# Patient Record
Sex: FEMALE | Race: WHITE | Employment: FULL TIME | URBAN - METROPOLITAN AREA
[De-identification: names, ages, dates, MRNs, and addresses within clinical notes are randomized per-mention and may not be internally consistent; named-entity substitution may affect disease eponyms.]

---

## 2017-02-15 ENCOUNTER — OFFICE VISIT (OUTPATIENT)
Dept: INTERNAL MEDICINE CLINIC | Age: 47
End: 2017-02-15

## 2017-02-15 VITALS
OXYGEN SATURATION: 98 % | DIASTOLIC BLOOD PRESSURE: 65 MMHG | TEMPERATURE: 98.5 F | SYSTOLIC BLOOD PRESSURE: 99 MMHG | HEART RATE: 83 BPM | BODY MASS INDEX: 23.22 KG/M2 | RESPIRATION RATE: 16 BRPM | HEIGHT: 64 IN | WEIGHT: 136 LBS

## 2017-02-15 DIAGNOSIS — Z00.00 ROUTINE GENERAL MEDICAL EXAMINATION AT A HEALTH CARE FACILITY: ICD-10-CM

## 2017-02-15 DIAGNOSIS — F43.9 SITUATIONAL STRESS: ICD-10-CM

## 2017-02-15 DIAGNOSIS — G43.119 INTRACTABLE MIGRAINE WITH AURA WITHOUT STATUS MIGRAINOSUS: Primary | ICD-10-CM

## 2017-02-15 RX ORDER — OMEPRAZOLE 40 MG/1
40 CAPSULE, DELAYED RELEASE ORAL DAILY
Qty: 90 CAP | Refills: 2 | Status: SHIPPED | OUTPATIENT
Start: 2017-02-15 | End: 2018-05-17 | Stop reason: SDUPTHER

## 2017-02-15 RX ORDER — PROMETHAZINE HYDROCHLORIDE 12.5 MG/1
12.5 TABLET ORAL
Qty: 20 TAB | Refills: 1 | Status: SHIPPED | OUTPATIENT
Start: 2017-02-15 | End: 2018-02-07 | Stop reason: SDUPTHER

## 2017-02-15 RX ORDER — CLONAZEPAM 0.5 MG/1
0.25 TABLET ORAL
Qty: 10 TAB | Refills: 0 | Status: SHIPPED | OUTPATIENT
Start: 2017-02-15 | End: 2017-03-30 | Stop reason: SDUPTHER

## 2017-02-15 RX ORDER — BUTALBITAL, ACETAMINOPHEN AND CAFFEINE 50; 325; 40 MG/1; MG/1; MG/1
1 TABLET ORAL
Qty: 25 TAB | Refills: 1 | Status: SHIPPED | OUTPATIENT
Start: 2017-02-15 | End: 2018-02-07 | Stop reason: SDUPTHER

## 2017-02-15 NOTE — PROGRESS NOTES
Jessica Grimm is a 42 y.o. female and presents with Well Woman (fasting       no pap )  . Subjective:  Pt present for annual. She is tearful during history taking. Pt reports broke up with her 10 year relationship and now her ex boyfriend is dating her best friend. She does not want to see a counselor right now. She is taking 1/2 tab clonazepam 0.5 mg for the past 2 weeks. Clonazepam helps her mind stop racing. She did not like being on zoloft because it caused her dizziness when starting but when on and skipped a tab did have more dizziness and headache. She has never tried any other medications. She reports found out 3 weeks ago and had 2 migraines. She feels she gets good sleep on the clonazepam, 7 hours, and feels rested. She stopped her zoloft in the summer. She never required clonazepam while on zoloft or even being off zoloft. amitriptline she gained 30 pounds in 30 days. She bought melatonin to try.    mental health 6-7 /10   One twinSon has addiction issues  The other son  in July  Daughter obese    JERRY  Can not take topamax    Trap muscle spasm  Still there with stress    Cigarette  Restarted   Smokes because stressed  10 cigs/day  Drinks etoh once a month- does not drink etoh with migraine hx      Review of Systems  Constitutional: negative for fevers, chills, anorexia and weight loss  Eyes:   negative for visual disturbance and irritation  ENT:   negative for tinnitus,sore throat,nasal congestion,ear pains. hoarseness  Respiratory:  negative for cough, hemoptysis, dyspnea,wheezing  CV:   negative for chest pain, palpitations, lower extremity edema  GI:   negative for nausea, vomiting, diarrhea, abdominal pain,melena  Endo:               negative for polyuria,polydipsia,polyphagia,heat intolerance  Genitourinary: negative for frequency, dysuria and hematuria  Integument:  negative for rash and pruritus  Hematologic:  negative for easy bruising and gum/nose bleeding  Musculoskel: negative for myalgias, arthralgias, back pain, muscle weakness, joint pain  Neurological:  negative for headaches, dizziness, vertigo, memory problems and gait   Behavl/Psych: negative for feelings of anxiety, depression, mood changes    Past Medical History   Diagnosis Date    Anxiety     GERD (gastroesophageal reflux disease)     Headache      Past Surgical History   Procedure Laterality Date    Pr breast surgery procedure unlisted      Hx total abdominal hysterectomy  2007     fibroids and cysts     Social History     Social History    Marital status:      Spouse name: N/A    Number of children: N/A    Years of education: N/A     Social History Main Topics    Smoking status: Current Some Day Smoker     Last attempt to quit: 7/1/2013    Smokeless tobacco: Never Used      Comment: 20 cigs/day about 15 years off and on    Alcohol use 0.0 oz/week     0 Standard drinks or equivalent per week      Comment: occasional    Drug use: No    Sexual activity: Yes     Partners: Male     Other Topics Concern    None     Social History Narrative    Full time: sedentary     Manageable stress        3 kids raised by herself    20 yo daughter     25 yo twin men        2 children live at home and one of the twins got      Family History   Problem Relation Age of Onset    Thyroid Disease Mother     Cancer Father      lymphoma    Diabetes Father     Heart Disease Father     Hypertension Father     Diabetes Maternal Grandmother     Thyroid Disease Maternal Grandmother     Cancer Maternal Grandfather     Thyroid Disease Paternal Grandmother      Current Outpatient Prescriptions   Medication Sig Dispense Refill    clonazePAM (KLONOPIN) 0.5 mg tablet Take 0.5 Tabs by mouth nightly as needed. Max Daily Amount: 0.25 mg. 7 Tab 0    omeprazole (PRILOSEC) 40 mg capsule Take 1 Cap by mouth daily.  90 Cap 1    methocarbamol (ROBAXIN) 500 mg tablet Take 1-2 tabs po at night only as needed for trapezius pain 30 Tab 0    butalbital-acetaminophen-caffeine (FIORICET, ESGIC) -40 mg per tablet Take 1 Tab by mouth every six (6) hours as needed for Pain. Max Daily Amount: 4 Tabs. 6 Tab 0    fluticasone (FLONASE) 50 mcg/actuation nasal spray 2 sprays by Both Nostrils route daily. 1 Bottle 0    sertraline (ZOLOFT) 50 mg tablet Take 1 Tab by mouth daily. 90 Tab 1    montelukast (SINGULAIR) 10 mg tablet Take 1 Tab by mouth daily. 30 Tab 4    VIVELLE-DOT 0.1 mg/24 hr        Allergies   Allergen Reactions    Amitriptyline Other (comments)     Significant weight gain       Objective:  Visit Vitals    BP 99/65 (BP 1 Location: Left arm, BP Patient Position: Sitting)    Pulse 83    Temp 98.5 °F (36.9 °C) (Oral)    Resp 16    Ht 5' 4\" (1.626 m)    Wt 136 lb (61.7 kg)    SpO2 98%    BMI 23.34 kg/m2     Physical Exam:   General appearance - alert, well appearing, and in no distress  Mental status - alert, oriented to person, place, and time  EYE-BENJAMIN, EOMI, fundi normal, corneas normal, no foreign bodies, visual acuity normal both eyes, no periorbital cellulitis  ENT-ENT exam normal, no neck nodes or sinus tenderness  Nose - normal and patent, no erythema, discharge or polyps  Mouth - mucous membranes moist, pharynx normal without lesions  Neck - supple, no significant adenopathy   Chest - clear to auscultation, no wheezes, rales or rhonchi, symmetric air entry   Heart - normal rate, regular rhythm, normal S1, S2, no murmurs, rubs, clicks or gallops   Abdomen - soft, nontender, nondistended, no masses or organomegaly  Lymph- no adenopathy palpable  Ext-peripheral pulses normal, no pedal edema, no clubbing or cyanosis  Skin-Warm and dry.  no hyperpigmentation, vitiligo, or suspicious lesions  Neuro -alert, oriented, normal speech, no focal findings or movement disorder noted  Neck-normal C-spine, no tenderness, full ROM without pain  Gyn not indicated as pt with MARY bso      Results for orders placed or performed in visit on 10/22/15   LIPID PANEL   Result Value Ref Range    Cholesterol, total 234 (H) 100 - 199 mg/dL    Triglyceride 104 0 - 149 mg/dL    HDL Cholesterol 74 >39 mg/dL    VLDL, calculated 21 5 - 40 mg/dL    LDL, calculated 139 (H) 0 - 99 mg/dL   METABOLIC PANEL, COMPREHENSIVE   Result Value Ref Range    Glucose 86 65 - 99 mg/dL    BUN 10 6 - 24 mg/dL    Creatinine 0.72 0.57 - 1.00 mg/dL    GFR est non- >59 mL/min/1.73    GFR est  >59 mL/min/1.73    BUN/Creatinine ratio 14 9 - 23    Sodium 141 134 - 144 mmol/L    Potassium 4.7 3.5 - 5.2 mmol/L    Chloride 102 97 - 108 mmol/L    CO2 22 18 - 29 mmol/L    Calcium 9.5 8.7 - 10.2 mg/dL    Protein, total 6.5 6.0 - 8.5 g/dL    Albumin 4.8 3.5 - 5.5 g/dL    GLOBULIN, TOTAL 1.7 1.5 - 4.5 g/dL    A-G Ratio 2.8 (H) 1.1 - 2.5    Bilirubin, total 0.2 0.0 - 1.2 mg/dL    Alk.  phosphatase 73 39 - 117 IU/L    AST (SGOT) 14 0 - 40 IU/L    ALT (SGPT) 11 0 - 32 IU/L   TSH, 3RD GENERATION   Result Value Ref Range    TSH 1.200 0.450 - 4.500 uIU/mL   CVD REPORT   Result Value Ref Range    INTERPRETATION Note      Prevention    Cardiovascular profile  Family hx  Exercising:  Will get back to exercising  Blood pressure:  Health healthy diet:  Diabetes:  Cholesterol:  Renal function:      Cancer risk profile  [facility] due 7/15 cig smoking---[facility] since [facility]  Lung no sx  Colonoscopy no blood stool  Skin nonhealing in 2 weeks no sx  Gyn abnormal bleeding/discharge/abd pain/pressure no sx, sees gyn      Thyroid sx  + anxiety situatioanally related ot son    Osteopenia prevention  Calcium 1000mg/day yes  Vitamin D 800iu/day yes    Mental health scale: 7-8/10 without situational stress; she can handle her situation about 6 /10  Depression  Anxiety  Sleep # of hours:  Energy Level:        Immunizations  TDAP defer  Pneumonia vaccine  Flu vaccine  Shingles vaccine  HPV    Epiyadira Gave was seen today for well woman. Diagnoses and all orders for this visit:    Intractable migraine with aura without status migrainosus  -     butalbital-acetaminophen-caffeine (FIORICET, ESGIC) -40 mg per tablet; Take 1 Tab by mouth every six (6) hours as needed for Pain or Headache. Max Daily Amount: 4 Tabs. -     promethazine (PHENERGAN) 12.5 mg tablet; Take 1 Tab by mouth every six (6) hours as needed for Nausea. Caution will cause sedation do not take with any meds like clonazepam or other sed meds    Routine general medical examination at a health care facility  -     omeprazole (PRILOSEC) 40 mg capsule; Take 1 Cap by mouth daily. Take only as needed  -     LIPID PANEL  -     TSH 3RD GENERATION  -     CBC W/O DIFF  -     REFERRAL TO DERMATOLOGY    Situational stress  Acute onset   Discussed se of long term use of clonazepam  She will monitor use and mother who is a nurse will also monitor  I strongly recommended Rose Mcclain  I do not foresee long term use of benzo and pt will wean off /use prn  -     REFERRAL TO PSYCHOLOGY  -     clonazePAM (KLONOPIN) 0.5 mg tablet; Take 0.5 Tabs by mouth nightly as needed. Max Daily Amount: 0.25 mg. This note will not be viewable in [address]

## 2017-02-15 NOTE — MR AVS SNAPSHOT
Visit Information Date & Time Provider Department Dept. Phone Encounter #  
 2/15/2017  8:45 AM Cheryl Diaz MD [facility] [address] [id number] Upcoming Health Maintenance Date Due Pneumococcal 19-64 Medium Risk (1 of 1 - PPSV23) 10/19/1991 DTaP/Tdap/Td series (1 - Tdap) 10/19/1993 BREAST CANCER SCRN MAMMOGRAM 6/9/2015 INFLUENZA AGE 9 TO ADULT 8/1/2016 PAP AKA CERVICAL CYTOLOGY 6/9/2017 Allergies as of 2/15/2017  Review Complete On: 2/15/2017 By: Cheryl Diaz MD  
  
 Severity Noted Reaction Type Reactions Amitriptyline  10/22/2015    Other (comments) Significant weight gain Current Immunizations  Never Reviewed No immunizations on file. Not reviewed this visit You Were Diagnosed With   
  
 Codes Comments Intractable migraine with aura without status migrainosus    -  Primary ICD-10-CM: D36.871 ICD-9-CM: 346.01 Routine general medical examination at a health care facility     ICD-10-CM: Z00.00 ICD-9-CM: V70.0 Situational stress     ICD-10-CM: F43.9 ICD-9-CM: V62.89 Vitals BP Pulse Temp Resp Height(growth percentile) Weight(growth percentile) 99/65 (BP 1 Location: Left arm, BP Patient Position: Sitting) 83 98.5 °F (36.9 °C) (Oral) 16 5' 4\" (1.626 m) 136 lb (61.7 kg) SpO2 BMI OB Status Smoking Status 98% 23.34 kg/m2 Hysterectomy Current Some Day Smoker BMI and BSA Data Body Mass Index Body Surface Area  
 23.34 kg/m 2 1.67 m 2 Preferred Pharmacy Pharmacy Name Phone [facility] [address] [phone] Your Updated Medication List  
  
   
This list is accurate as of: 2/15/17 10:03 AM.  Always use your most recent med list.  
  
  
  
  
 butalbital-acetaminophen-caffeine -40 mg per tablet Commonly known as:  Surinder Godfrey  
 Take 1 Tab by mouth every six (6) hours as needed for Pain or Headache. Max Daily Amount: 4 Tabs. clonazePAM 0.5 mg tablet Commonly known as:  Karthik Aver Take 0.5 Tabs by mouth nightly as needed. Max Daily Amount: 0.25 mg.  
  
 fluticasone 50 mcg/actuation nasal spray Commonly known as:  FLONASE  
2 sprays by Both Nostrils route daily. methocarbamol 500 mg tablet Commonly known as:  ROBAXIN Take 1-2 tabs po at night only as needed for trapezius pain  
  
 montelukast 10 mg tablet Commonly known as:  SINGULAIR Take 1 Tab by mouth daily. omeprazole 40 mg capsule Commonly known as:  PRILOSEC Take 1 Cap by mouth daily. Take only as needed  
  
 promethazine 12.5 mg tablet Commonly known as:  PHENERGAN Take 1 Tab by mouth every six (6) hours as needed for Nausea. Caution will cause sedation do not take with any meds like clonazepam or other sed meds VIVELLE-DOT 0.1 mg/24 hr  
Generic drug:  estradiol Prescriptions Printed Refills  
 clonazePAM (KLONOPIN) 0.5 mg tablet 0 Sig: Take 0.5 Tabs by mouth nightly as needed. Max Daily Amount: 0.25 mg.  
 Class: Print Route: Oral  
 butalbital-acetaminophen-caffeine (FIORICET, ESGIC) -40 mg per tablet 1 Sig: Take 1 Tab by mouth every six (6) hours as needed for Pain or Headache. Max Daily Amount: 4 Tabs. Class: Print Route: Oral  
 promethazine (PHENERGAN) 12.5 mg tablet 1 Sig: Take 1 Tab by mouth every six (6) hours as needed for Nausea. Caution will cause sedation do not take with any meds like clonazepam or other sed meds Class: Print Route: Oral  
  
Prescriptions Sent to Pharmacy Refills  
 omeprazole (PRILOSEC) 40 mg capsule 2 Sig: Take 1 Cap by mouth daily. Take only as needed Class: Normal  
 Pharmacy: [facility] [address] [facility] [address] #: [phone]  Route: Oral  
  
 We Performed the Following CBC W/O DIFF [19572 CPT(R)] LIPID PANEL [11090 CPT(R)] REFERRAL TO DERMATOLOGY [REF19 Custom] Comments:  
 Skin cancer screening REFERRAL TO PSYCHOLOGY [KRR34 Custom] TSH 3RD GENERATION [62993 CPT(R)] Referral Information Referral ID Referred By Referred To  
  
 [id number] Shy Ed Corrinafranciscolaxmi [address] [facility] [address] [place] [address] Phone: [phone] Fax: [phone] Visits Status Start Date End Date 1 New Request 2/15/17 2/15/18 If your referral has a status of pending review or denied, additional information will be sent to support the outcome of this decision. Referral ID Referred By Referred To  
 [id number] Aram Bush MD  
   [address] [place] [address] Phone: [phone] Fax: [phone] Visits Status Start Date End Date 1 New Request 2/15/17 2/15/18 If your referral has a status of pending review or denied, additional information will be sent to support the outcome of this decision. Introducing [facility] SERVICES! Dear Mario Gayle: Thank you for requesting a [employer] account. Our records indicate that you already have an active [employer] account. You can access your account anytime at [email or web address]. [email or web address] Did you know that you can access your hospital and ER discharge instructions at any time in [employer]? You can also review all of your test results from your hospital stay or ER visit. Additional Information If you have questions, please visit the Frequently Asked Questions section of the [employer] website at [email or web address]/. Remember, [employer] is NOT to be used for urgent needs. For medical emergencies, dial 911. Now available from your iPhone and Android! Please provide this summary of care documentation to your next provider. Your primary care clinician is listed as [facility]. If you have any questions after today's visit, please call [phone].

## 2017-02-15 NOTE — PROGRESS NOTES
Have you been to the ER or urgent care clinic since your last visit? No     Have you been hospitalized since your last visit? No     Have you been seen or consulted any other health care provider outside of [address] [place] [address] since your last visit (including pap smears, colonoscopy screening)?     Pap x 2 years    Total Hyst.           Monica x 2 years    Today

## 2017-02-16 LAB
CHOLEST SERPL-MCNC: 215 MG/DL (ref 100–199)
ERYTHROCYTE [DISTWIDTH] IN BLOOD BY AUTOMATED COUNT: 14.4 % (ref 12.3–15.4)
HCT VFR BLD AUTO: 48.7 % (ref 34–46.6)
HDLC SERPL-MCNC: 60 MG/DL
HGB BLD-MCNC: 16.6 G/DL (ref 11.1–15.9)
INTERPRETATION, 910389: NORMAL
LDLC SERPL CALC-MCNC: 130 MG/DL (ref 0–99)
MCH RBC QN AUTO: 30.7 PG (ref 26.6–33)
MCHC RBC AUTO-ENTMCNC: 34.1 G/DL (ref 31.5–35.7)
MCV RBC AUTO: 90 FL (ref 79–97)
PLATELET # BLD AUTO: 216 X10E3/UL (ref 150–379)
RBC # BLD AUTO: 5.41 X10E6/UL (ref 3.77–5.28)
TRIGL SERPL-MCNC: 124 MG/DL (ref 0–149)
TSH SERPL DL<=0.005 MIU/L-ACNC: 1.33 UIU/ML (ref 0.45–4.5)
VLDLC SERPL CALC-MCNC: 25 MG/DL (ref 5–40)
WBC # BLD AUTO: 7.5 X10E3/UL (ref 3.4–10.8)

## 2017-03-30 DIAGNOSIS — F43.9 SITUATIONAL STRESS: ICD-10-CM

## 2017-03-30 RX ORDER — CLONAZEPAM 0.5 MG/1
0.25 TABLET ORAL
Qty: 10 TAB | Refills: 0 | OUTPATIENT
Start: 2017-03-30 | End: 2018-02-07 | Stop reason: ALTCHOICE

## 2017-03-30 NOTE — TELEPHONE ENCOUNTER
From: Jayesh Hutton  To: Dhara Rivera MD  Sent: 3/30/2017 10:39 AM EDT  Subject: Medication Renewal Request    Original authorizing provider: MD Jayesh Boston would like a refill of the following medications:  clonazePAM (KLONOPIN) 0.5 mg tablet Dhara Rivera MD]    Preferred pharmacy: [address] [place] [address] AT [facility]:  I am doing MUCH better and using this medication less than before BUT. Sonia Aldrich I am left with only 1.5 pills left. I only take it when necessary (NOT every night). I would like to have some on hand to help if needed.

## 2017-03-31 NOTE — TELEPHONE ENCOUNTER
Verbal order per Dr. Odilia Agustin for calling in medications   Requested Prescriptions     Signed Prescriptions Disp Refills    clonazePAM (KLONOPIN) 0.5 mg tablet 10 Tab 0     Sig: Take 0.5 Tabs by mouth nightly as needed. Max Daily Amount: 0.25 mg. Authorizing Provider: Shelia Mondragon         Phone in.

## 2018-02-07 ENCOUNTER — OFFICE VISIT (OUTPATIENT)
Dept: INTERNAL MEDICINE CLINIC | Age: 48
End: 2018-02-07

## 2018-02-07 VITALS
RESPIRATION RATE: 18 BRPM | HEIGHT: 64 IN | OXYGEN SATURATION: 98 % | BODY MASS INDEX: 21.65 KG/M2 | DIASTOLIC BLOOD PRESSURE: 68 MMHG | HEART RATE: 75 BPM | SYSTOLIC BLOOD PRESSURE: 110 MMHG | TEMPERATURE: 98 F | WEIGHT: 126.8 LBS

## 2018-02-07 DIAGNOSIS — J01.10 ACUTE NON-RECURRENT FRONTAL SINUSITIS: Primary | ICD-10-CM

## 2018-02-07 DIAGNOSIS — M79.18 MUSCULOSKELETAL PAIN: ICD-10-CM

## 2018-02-07 DIAGNOSIS — J40 BRONCHITIS: ICD-10-CM

## 2018-02-07 DIAGNOSIS — G43.119 INTRACTABLE MIGRAINE WITH AURA WITHOUT STATUS MIGRAINOSUS: ICD-10-CM

## 2018-02-07 RX ORDER — AZITHROMYCIN 250 MG/1
250 TABLET, FILM COATED ORAL SEE ADMIN INSTRUCTIONS
Qty: 6 TAB | Refills: 0 | Status: SHIPPED | OUTPATIENT
Start: 2018-02-07 | End: 2018-02-12

## 2018-02-07 RX ORDER — METHOCARBAMOL 500 MG/1
TABLET, FILM COATED ORAL
Qty: 30 TAB | Refills: 0 | Status: SHIPPED | OUTPATIENT
Start: 2018-02-07 | End: 2021-09-09

## 2018-02-07 RX ORDER — FLUTICASONE PROPIONATE AND SALMETEROL 250; 50 UG/1; UG/1
1 POWDER RESPIRATORY (INHALATION) 2 TIMES DAILY
Qty: 1 EACH | Refills: 0 | Status: SHIPPED | OUTPATIENT
Start: 2018-02-07 | End: 2021-09-09

## 2018-02-07 RX ORDER — PROMETHAZINE HYDROCHLORIDE 12.5 MG/1
12.5 TABLET ORAL
Qty: 20 TAB | Refills: 1 | Status: SHIPPED | OUTPATIENT
Start: 2018-02-07 | End: 2018-05-17 | Stop reason: SDUPTHER

## 2018-02-07 RX ORDER — BUTALBITAL, ACETAMINOPHEN AND CAFFEINE 50; 325; 40 MG/1; MG/1; MG/1
1 TABLET ORAL
Qty: 25 TAB | Refills: 1 | Status: SHIPPED | OUTPATIENT
Start: 2018-02-07 | End: 2018-08-15 | Stop reason: SDUPTHER

## 2018-02-07 NOTE — PROGRESS NOTES
Chief Complaint   Patient presents with    Cold Symptoms     x 3 days, pt stated she is very weak, her lungs are full of water in the morning, she is coughing a lot in the morning when she wake up     1. Have you been to the ER, urgent care clinic since your last visit? Hospitalized since your last visit? No    2. Have you seen or consulted any other health care providers outside of the [facility] since your last visit? Include any pap smears or colon screening.  No

## 2018-02-07 NOTE — PROGRESS NOTES
Scotty Stewart is a 41 y.o. female and presents with Cold Symptoms (x 3 days, pt stated she is very weak, her lungs are full of water in the morning, she is coughing a lot in the morning when she wake up)  . sinusitis  Presents with nasal blockage, post nasal drip and bilateral sinus pain for 1 weeks. Denies shortness of breath, chest pain, abdominal pain, nausea, vomiting,  sneezing. Symptoms are moderate. Recent treatment for similar symptoms? None. Has tried over-the-counter remedies none with mild and paritial relief of symptoms. Contacts with similar infections: yes. Asthma?:  no.   reports that she has been smoking. She has never used smokeless tobacco.she is still smoking about 1/2 pack per day. She needs to quit because she will be a grandmother. She has required steroids along with zpack in the past.      Bronchitis  She is still smoking 10 cigs/day. No fevers no green purulent sputum. Sx are moderate. advil mild relief. HA  Can not take topamax as it caused side effects. amitripyline made her gain 30 pounds in 30 days. She gets a migraine ha about 4 times per month. She has tried prophylactic meds in the past. She reports today is not a migraine but more a sinus headache    Neck pain  Trap muscle spasm  Still there with stress because she wants to buy her grandkids lots to things to spoil them. Cigarette  Restarted   Smokes because stressed  10 cigs/day        Review of Systems  Constitutional: negative for fevers, chills, anorexia and weight loss  Eyes:   negative for visual disturbance and irritation  ENT:   negative for tinnitus,sore throat,nasal congestion,ear pains. hoarseness  Respiratory:  negative for cough, hemoptysis, dyspnea,wheezing  CV:   negative for chest pain, palpitations, lower extremity edema  GI:   negative for nausea, vomiting, diarrhea, abdominal pain,melena  Endo:               negative for polyuria,polydipsia,polyphagia,heat intolerance  Genitourinary: negative for frequency, dysuria and hematuria  Integument:  negative for rash and pruritus  Hematologic:  negative for easy bruising and gum/nose bleeding  Musculoskel: negative for myalgias, arthralgias, back pain, muscle weakness, joint pain  Neurological:  negative for headaches, dizziness, vertigo, memory problems and gait   Behavl/Psych: negative for feelings of anxiety, depression, mood changes    Past Medical History:   Diagnosis Date    Anxiety     GERD (gastroesophageal reflux disease)     Headache      Past Surgical History:   Procedure Laterality Date    BREAST SURGERY PROCEDURE UNLISTED      HX TOTAL ABDOMINAL HYSTERECTOMY  2007    fibroids and cysts     Social History     Social History    Marital status:      Spouse name: N/A    Number of children: N/A    Years of education: N/A     Social History Main Topics    Smoking status: Current Some Day Smoker     Last attempt to quit: 7/1/2013    Smokeless tobacco: Never Used      Comment: 20 cigs/day about 15 years off and on    Alcohol use 0.0 oz/week     0 Standard drinks or equivalent per week      Comment: occasional    Drug use: No    Sexual activity: Yes     Partners: Male     Other Topics Concern    None     Social History Narrative    Full time: sedentary     Manageable stress        3 kids raised by herself    20 yo daughter     23 yo twin men        2 children live at home and one of the twins got      Family History   Problem Relation Age of Onset    Thyroid Disease Mother     Cancer Father      lymphoma    Diabetes Father     Heart Disease Father     Hypertension Father     Diabetes Maternal Grandmother     Thyroid Disease Maternal Grandmother     Cancer Maternal Grandfather     Thyroid Disease Paternal Grandmother      Current Outpatient Prescriptions   Medication Sig Dispense Refill    butalbital-acetaminophen-caffeine (FIORICET, ESGIC) -40 mg per tablet Take 1 Tab by mouth every six (6) hours as needed for Pain or Headache. Max Daily Amount: 4 Tabs. 25 Tab 1    promethazine (PHENERGAN) 12.5 mg tablet Take 1 Tab by mouth every six (6) hours as needed for Nausea. Caution will cause sedation do not take with any meds like clonazepam or other sed meds 20 Tab 1    fluticasone (FLONASE) 50 mcg/actuation nasal spray 2 sprays by Both Nostrils route daily. 1 Bottle 0    clonazePAM (KLONOPIN) 0.5 mg tablet Take 0.5 Tabs by mouth nightly as needed. Max Daily Amount: 0.25 mg. 10 Tab 0    omeprazole (PRILOSEC) 40 mg capsule Take 1 Cap by mouth daily. Take only as needed 90 Cap 2    methocarbamol (ROBAXIN) 500 mg tablet Take 1-2 tabs po at night only as needed for trapezius pain 30 Tab 0    montelukast (SINGULAIR) 10 mg tablet Take 1 Tab by mouth daily.  30 Tab 4    VIVELLE-DOT 0.1 mg/24 hr        Allergies   Allergen Reactions    Amitriptyline Other (comments)     Significant weight gain       Objective:  Visit Vitals    /68 (BP 1 Location: Right arm, BP Patient Position: Sitting)    Pulse 75    Temp 98 °F (36.7 °C) (Oral)    Resp 18    Ht 5' 4\" (1.626 m)    Wt 126 lb 12.8 oz (57.5 kg)    SpO2 98%    BMI 21.77 kg/m2     Physical Exam:   General appearance - alert, well appearing, and in no distress  Mental status - alert, oriented to person, place, and time  EYE-BENJAMIN, EOMI, fundi normal, corneas normal, no foreign bodies, visual acuity normal both eyes, no periorbital cellulitis  ENT-ENT exam normal, no neck nodes or sinus tenderness  Nose - normal and patent, no erythema, discharge or polyps  Mouth - mucous membranes moist, pharynx normal without lesions  Neck - supple, no significant adenopathy   Chest - clear to auscultation, no wheezes, rales or rhonchi, symmetric air entry   Heart - normal rate, regular rhythm, normal S1, S2, no murmurs, rubs, clicks or gallops   Abdomen - soft, nontender, nondistended, no masses or organomegaly  Lymph- no adenopathy palpable  Ext-peripheral pulses normal, no pedal edema, no clubbing or cyanosis  Skin-Warm and dry.  no hyperpigmentation, vitiligo, or suspicious lesions  Neuro -alert, oriented, normal speech, no focal findings or movement disorder noted  Neck-normal C-spine, no tenderness, full ROM without pain  Gyn not indicated as pt with MARY bso      Results for orders placed or performed in visit on 02/15/17   LIPID PANEL   Result Value Ref Range    Cholesterol, total 215 (H) 100 - 199 mg/dL    Triglyceride 124 0 - 149 mg/dL    HDL Cholesterol 60 >39 mg/dL    VLDL, calculated 25 5 - 40 mg/dL    LDL, calculated 130 (H) 0 - 99 mg/dL   TSH 3RD GENERATION   Result Value Ref Range    TSH 1.330 0.450 - 4.500 uIU/mL   CBC W/O DIFF   Result Value Ref Range    WBC 7.5 3.4 - 10.8 x10E3/uL    RBC 5.41 (H) 3.77 - 5.28 x10E6/uL    HGB 16.6 (H) 11.1 - 15.9 g/dL    HCT 48.7 (H) 34.0 - 46.6 %    MCV 90 79 - 97 fL    MCH 30.7 26.6 - 33.0 pg    MCHC 34.1 31.5 - 35.7 g/dL    RDW 14.4 12.3 - 15.4 %    PLATELET 441 121 - 905 x10E3/uL   CVD REPORT   Result Value Ref Range    INTERPRETATION Note      Prevention    Cardiovascular profile  Family hx  Exercising:  Will get back to exercising  Blood pressure:  Health healthy diet:  Diabetes:  Cholesterol:  Renal function:      Cancer risk profile  [facility] due 7/15 cig smoking---[facility] since [facility]  Lung no sx  Colonoscopy no blood stool  Skin nonhealing in 2 weeks no sx  Gyn abnormal bleeding/discharge/abd pain/pressure no sx, sees gyn      Thyroid sx  + anxiety situatioanally related ot son    Osteopenia prevention  Calcium 1000mg/day yes  Vitamin D 800iu/day yes    Mental health scale: 7-8/10 without situational stress; she can handle her situation about 6 /10  Depression  Anxiety  Sleep # of hours:  Energy Level:        Immunizations  TDAP defer  Pneumonia vaccine  Flu vaccine  Shingles vaccine  HPV    Diagnoses and all orders for this visit:    1. Acute non-recurrent frontal sinusitis  -     azithromycin (ZITHROMAX) 250 mg tablet; Take 1 Tab by mouth See Admin Instructions for 5 days. Will monitor  Historically may need prednisone  2. Intractable migraine with aura without status migrainosus  Discussed prophylaxis and has tried in the past but had se of these meds  -     butalbital-acetaminophen-caffeine (FIORICET, ESGIC) -40 mg per tablet; Take 1 Tab by mouth every six (6) hours as needed for Pain or Headache. -     promethazine (PHENERGAN) 12.5 mg tablet; Take 1 Tab by mouth every six (6) hours as needed for Nausea. Caution will cause sedation do not take with any meds like clonazepam or other sed meds    3. Musculoskeletal pain  Trapezius pain ini nsaids can use robaxin  -     methocarbamol (ROBAXIN) 500 mg tablet; Take 1-2 tabs po at night only as needed for trapezius pain    4. Bronchitis  -     fluticasone-salmeterol (ADVAIR) 250-50 mcg/dose diskus inhaler; Take 1 Puff by inhalation two (2) times a day. MUST RINSE MOUTH AFTER USE    This note will not be viewable in [facility].

## 2018-02-07 NOTE — LETTER
NOTIFICATION RETURN TO WORK / SCHOOL 
 
2/7/2018 2:00 PM 
 
Ms. Pura Stockton [address] [facility] [address] [id number] To Whom It May Concern: 
 
Pura Stockton is currently under the care of [address] [place] [address],[address]. Please excuse work 2/6/18 and 2/7/18 due to medical illness. If there are questions or concerns please have the patient contact our office. Sincerely, Chelsey Andrade MD

## 2018-02-07 NOTE — MR AVS SNAPSHOT
[facility] 
 
 
 [address] [place] [address] [facility] 
[phone] Patient: Talya Henderson MRN: [id number] :1970 Visit Information Date & Time Provider Department Dept. Phone Encounter #  
 2018  1:20 PM Jamie Lauren MD [facility] [address] [id number] Upcoming Health Maintenance Date Due Pneumococcal 19-64 Medium Risk (1 of 1 - PPSV23) 10/19/1991 DTaP/Tdap/Td series (1 - Tdap) 10/19/1993 PAP AKA CERVICAL CYTOLOGY 2017 Influenza Age 5 to Adult 2017 BREAST CANCER SCRN MAMMOGRAM 2018 Allergies as of 2018  Review Complete On: 2018 By: Jamie Lauren MD  
  
 Severity Noted Reaction Type Reactions Amitriptyline  10/22/2015    Other (comments) Significant weight gain Current Immunizations  Never Reviewed No immunizations on file. Not reviewed this visit You Were Diagnosed With   
  
 Codes Comments Acute non-recurrent frontal sinusitis    -  Primary ICD-10-CM: J01.10 ICD-9-CM: 230.3 Intractable migraine with aura without status migrainosus     ICD-10-CM: G43.119 ICD-9-CM: 346.01   
 Musculoskeletal pain     ICD-10-CM: M79.1 ICD-9-CM: 729.1 Bronchitis     ICD-10-CM: J40 ICD-9-CM: 297 Vitals BP Pulse Temp Resp Height(growth percentile) Weight(growth percentile) 110/68 (BP 1 Location: Right arm, BP Patient Position: Sitting) 75 98 °F (36.7 °C) (Oral) 18 5' 4\" (1.626 m) 126 lb 12.8 oz (57.5 kg) SpO2 BMI OB Status Smoking Status 98% 21.77 kg/m2 Hysterectomy Current Some Day Smoker Vitals History BMI and BSA Data Body Mass Index Body Surface Area 21.77 kg/m 2 1.61 m 2 Preferred Pharmacy Pharmacy Name Phone [facility] [address] [facility] [phone] Your Updated Medication List  
  
   
 This list is accurate as of: 2/7/18  1:58 PM.  Always use your most recent med list.  
  
  
  
  
 azithromycin 250 mg tablet Commonly known as:  Malu Sick Take 1 Tab by mouth See Admin Instructions for 5 days. butalbital-acetaminophen-caffeine -40 mg per tablet Commonly known as:  Yobani Lydia Take 1 Tab by mouth every six (6) hours as needed for Pain or Headache. fluticasone 50 mcg/actuation nasal spray Commonly known as:  FLONASE  
2 sprays by Both Nostrils route daily. fluticasone-salmeterol 250-50 mcg/dose diskus inhaler Commonly known as:  ADVAIR Take 1 Puff by inhalation two (2) times a day. MUST RINSE MOUTH AFTER USE  
  
 methocarbamol 500 mg tablet Commonly known as:  ROBAXIN Take 1-2 tabs po at night only as needed for trapezius pain  
  
 montelukast 10 mg tablet Commonly known as:  SINGULAIR Take 1 Tab by mouth daily. omeprazole 40 mg capsule Commonly known as:  PRILOSEC Take 1 Cap by mouth daily. Take only as needed  
  
 promethazine 12.5 mg tablet Commonly known as:  PHENERGAN Take 1 Tab by mouth every six (6) hours as needed for Nausea. Caution will cause sedation do not take with any meds like clonazepam or other sed meds VIVELLE-DOT 0.1 mg/24 hr  
Generic drug:  estradiol Prescriptions Printed Refills  
 promethazine (PHENERGAN) 12.5 mg tablet 1 Sig: Take 1 Tab by mouth every six (6) hours as needed for Nausea. Caution will cause sedation do not take with any meds like clonazepam or other sed meds Class: Print Route: Oral  
  
Prescriptions Sent to Pharmacy Refills  
 butalbital-acetaminophen-caffeine (FIORICET, ESGIC) -40 mg per tablet 1 Sig: Take 1 Tab by mouth every six (6) hours as needed for Pain or Headache. Class: Normal  
 Pharm[facility] [address] [facility] [address] #: [phone]  Route: Oral  
 azithromycin (ZITHROMAX) 250 mg tablet 0 Sig: Take 1 Tab by mouth See Admin Instructions for 5 days. Class: Normal  
 Pharmacy: [facility], [address] [place] [address] [address] [facility] [place] [address] Ph #: [phone] Route: Oral  
 fluticasone-salmeterol (ADVAIR) 250-50 mcg/dose diskus inhaler 0 Sig: Take 1 Puff by inhalation two (2) times a day. MUST RINSE MOUTH AFTER USE Class: Normal  
 Pharmacy: [facility], [address] [place] [address] [address] [place] [address] Ph #: [phone] Route: Inhalation  
 methocarbamol (ROBAXIN) 500 mg tablet 0 Sig: Take 1-2 tabs po at night only as needed for trapezius pain  
 Class: Normal  
 Pharmacy: [facility], [address] [place], [place] [address]y [address] Ph #: [phone] Introducing [facility]! Dear Ruth Styles: Thank you for requesting a [employer] account. Our records indicate that you already have an active [employer] account. You can access your account anytime at [email or web address] Did you know that you can access your hospital and ER discharge instructions at any time in [employer]? You can also review all of your test results from your hospital stay or ER visit. Additional Information If you have questions, please visit the Frequently Asked Questions section of the [employer] website at [email or web address] Remember, [employer] is NOT to be used for urgent needs. For medical emergencies, dial 911. Now available from your iPhone and Android! Please provide this summary of care documentation to your next provider. Your primary care clinician is listed as Dawn Martinez. If you have any questions after today's visit, please call [phone].

## 2018-08-15 DIAGNOSIS — G43.119 INTRACTABLE MIGRAINE WITH AURA WITHOUT STATUS MIGRAINOSUS: ICD-10-CM

## 2018-08-15 RX ORDER — BUTALBITAL, ACETAMINOPHEN AND CAFFEINE 50; 325; 40 MG/1; MG/1; MG/1
TABLET ORAL
Qty: 25 TAB | Refills: 0 | Status: SHIPPED | OUTPATIENT
Start: 2018-08-15 | End: 2019-03-04 | Stop reason: SDUPTHER

## 2018-08-15 RX ORDER — PROMETHAZINE HYDROCHLORIDE 12.5 MG/1
TABLET ORAL
Qty: 20 TAB | Refills: 0 | Status: SHIPPED | OUTPATIENT
Start: 2018-08-15 | End: 2019-01-11 | Stop reason: SDUPTHER

## 2019-01-11 DIAGNOSIS — G43.119 INTRACTABLE MIGRAINE WITH AURA WITHOUT STATUS MIGRAINOSUS: ICD-10-CM

## 2019-01-11 RX ORDER — PROMETHAZINE HYDROCHLORIDE 12.5 MG/1
12.5 TABLET ORAL
Qty: 20 TAB | Refills: 0 | Status: SHIPPED | OUTPATIENT
Start: 2019-01-11 | End: 2019-03-04 | Stop reason: SDUPTHER

## 2019-03-04 DIAGNOSIS — G43.119 INTRACTABLE MIGRAINE WITH AURA WITHOUT STATUS MIGRAINOSUS: ICD-10-CM

## 2019-03-04 RX ORDER — BUTALBITAL, ACETAMINOPHEN AND CAFFEINE 50; 325; 40 MG/1; MG/1; MG/1
TABLET ORAL
Qty: 25 TAB | Refills: 0 | OUTPATIENT
Start: 2019-03-04

## 2019-03-04 RX ORDER — PROMETHAZINE HYDROCHLORIDE 12.5 MG/1
12.5 TABLET ORAL
Qty: 20 TAB | Refills: 0 | OUTPATIENT
Start: 2019-03-04

## 2019-03-04 RX ORDER — BUTALBITAL, ACETAMINOPHEN AND CAFFEINE 50; 325; 40 MG/1; MG/1; MG/1
1 TABLET ORAL
Qty: 25 TAB | Refills: 0 | Status: SHIPPED | OUTPATIENT
Start: 2019-03-04 | End: 2019-08-23 | Stop reason: SDUPTHER

## 2019-03-04 RX ORDER — PROMETHAZINE HYDROCHLORIDE 12.5 MG/1
12.5 TABLET ORAL
Qty: 20 TAB | Refills: 0 | Status: SHIPPED | OUTPATIENT
Start: 2019-03-04 | End: 2019-08-23 | Stop reason: SDUPTHER

## 2019-08-23 DIAGNOSIS — G43.119 INTRACTABLE MIGRAINE WITH AURA WITHOUT STATUS MIGRAINOSUS: ICD-10-CM

## 2019-08-23 RX ORDER — BUTALBITAL, ACETAMINOPHEN AND CAFFEINE 50; 325; 40 MG/1; MG/1; MG/1
1 TABLET ORAL
Qty: 25 TAB | Refills: 0 | Status: SHIPPED | OUTPATIENT
Start: 2019-08-23 | End: 2020-01-24 | Stop reason: SDUPTHER

## 2019-08-23 RX ORDER — PROMETHAZINE HYDROCHLORIDE 12.5 MG/1
12.5 TABLET ORAL
Qty: 20 TAB | Refills: 0 | Status: SHIPPED | OUTPATIENT
Start: 2019-08-23 | End: 2020-01-24 | Stop reason: SDUPTHER

## 2020-04-01 ENCOUNTER — OP HISTORICAL/CONVERTED ENCOUNTER (OUTPATIENT)
Dept: OTHER | Age: 50
End: 2020-04-01

## 2020-04-03 ENCOUNTER — VIRTUAL VISIT (OUTPATIENT)
Dept: INTERNAL MEDICINE CLINIC | Age: 50
End: 2020-04-03

## 2020-04-03 ENCOUNTER — ANESTHESIA (OUTPATIENT)
Dept: SURGERY | Age: 50
End: 2020-04-03
Payer: COMMERCIAL

## 2020-04-03 ENCOUNTER — ANESTHESIA EVENT (OUTPATIENT)
Dept: SURGERY | Age: 50
End: 2020-04-03
Payer: COMMERCIAL

## 2020-04-03 ENCOUNTER — TELEPHONE (OUTPATIENT)
Dept: INTERNAL MEDICINE CLINIC | Age: 50
End: 2020-04-03

## 2020-04-03 ENCOUNTER — HOSPITAL ENCOUNTER (OUTPATIENT)
Age: 50
Setting detail: OUTPATIENT SURGERY
Discharge: HOME | End: 2020-04-03
Attending: SURGERY | Admitting: SURGERY
Payer: COMMERCIAL

## 2020-04-03 VITALS
RESPIRATION RATE: 21 BRPM | OXYGEN SATURATION: 97 % | WEIGHT: 157 LBS | SYSTOLIC BLOOD PRESSURE: 120 MMHG | HEIGHT: 64 IN | TEMPERATURE: 98.4 F | DIASTOLIC BLOOD PRESSURE: 61 MMHG | HEART RATE: 78 BPM | BODY MASS INDEX: 26.8 KG/M2

## 2020-04-03 DIAGNOSIS — K35.30 ACUTE APPENDICITIS WITH LOCALIZED PERITONITIS, WITHOUT PERFORATION, ABSCESS, OR GANGRENE: Primary | ICD-10-CM

## 2020-04-03 DIAGNOSIS — R19.7 DIARRHEA, UNSPECIFIED TYPE: ICD-10-CM

## 2020-04-03 DIAGNOSIS — R10.31 RIGHT LOWER QUADRANT ABDOMINAL PAIN: Primary | ICD-10-CM

## 2020-04-03 PROCEDURE — 77030009851 HC PCH RTVR ENDOSC AMR -B: Performed by: SURGERY

## 2020-04-03 PROCEDURE — 77030031139 HC SUT VCRL2 J&J -A: Performed by: SURGERY

## 2020-04-03 PROCEDURE — 76060000032 HC ANESTHESIA 0.5 TO 1 HR: Performed by: SURGERY

## 2020-04-03 PROCEDURE — 74011250637 HC RX REV CODE- 250/637: Performed by: SURGERY

## 2020-04-03 PROCEDURE — 74011000250 HC RX REV CODE- 250: Performed by: SURGERY

## 2020-04-03 PROCEDURE — 77030012770 HC TRCR OPT FX AMR -B: Performed by: SURGERY

## 2020-04-03 PROCEDURE — 76210000016 HC OR PH I REC 1 TO 1.5 HR: Performed by: SURGERY

## 2020-04-03 PROCEDURE — 77030020747 HC TU INSUF ENDOSC TELE -A: Performed by: SURGERY

## 2020-04-03 PROCEDURE — 74011250636 HC RX REV CODE- 250/636: Performed by: SURGERY

## 2020-04-03 PROCEDURE — 76210000020 HC REC RM PH II FIRST 0.5 HR: Performed by: SURGERY

## 2020-04-03 PROCEDURE — 76010000138 HC OR TIME 0.5 TO 1 HR: Performed by: SURGERY

## 2020-04-03 PROCEDURE — 74011250636 HC RX REV CODE- 250/636: Performed by: NURSE ANESTHETIST, CERTIFIED REGISTERED

## 2020-04-03 PROCEDURE — C9290 INJ, BUPIVACAINE LIPOSOME: HCPCS | Performed by: SURGERY

## 2020-04-03 PROCEDURE — 77030026438 HC STYL ET INTUB CARD -A: Performed by: ANESTHESIOLOGY

## 2020-04-03 PROCEDURE — 74011000250 HC RX REV CODE- 250: Performed by: NURSE ANESTHETIST, CERTIFIED REGISTERED

## 2020-04-03 PROCEDURE — 77030002933 HC SUT MCRYL J&J -A: Performed by: SURGERY

## 2020-04-03 PROCEDURE — 74011250636 HC RX REV CODE- 250/636: Performed by: ANESTHESIOLOGY

## 2020-04-03 PROCEDURE — 77030018836 HC SOL IRR NACL ICUM -A: Performed by: SURGERY

## 2020-04-03 PROCEDURE — 77030011640 HC PAD GRND REM COVD -A: Performed by: SURGERY

## 2020-04-03 PROCEDURE — 77030008684 HC TU ET CUF COVD -B: Performed by: ANESTHESIOLOGY

## 2020-04-03 PROCEDURE — 77030011810 HC STPLR ENDOSC J&J -G: Performed by: SURGERY

## 2020-04-03 PROCEDURE — 88304 TISSUE EXAM BY PATHOLOGIST: CPT

## 2020-04-03 PROCEDURE — 77030020829: Performed by: SURGERY

## 2020-04-03 PROCEDURE — 74011000250 HC RX REV CODE- 250: Performed by: ANESTHESIOLOGY

## 2020-04-03 PROCEDURE — 77030008608 HC TRCR ENDOSC SMTH AMR -B: Performed by: SURGERY

## 2020-04-03 RX ORDER — DEXAMETHASONE SODIUM PHOSPHATE 4 MG/ML
INJECTION, SOLUTION INTRA-ARTICULAR; INTRALESIONAL; INTRAMUSCULAR; INTRAVENOUS; SOFT TISSUE AS NEEDED
Status: DISCONTINUED | OUTPATIENT
Start: 2020-04-03 | End: 2020-04-03 | Stop reason: HOSPADM

## 2020-04-03 RX ORDER — MIDAZOLAM HYDROCHLORIDE 1 MG/ML
INJECTION, SOLUTION INTRAMUSCULAR; INTRAVENOUS AS NEEDED
Status: DISCONTINUED | OUTPATIENT
Start: 2020-04-03 | End: 2020-04-03 | Stop reason: HOSPADM

## 2020-04-03 RX ORDER — METRONIDAZOLE 250 MG/1
TABLET ORAL 3 TIMES DAILY
COMMUNITY
End: 2021-09-09

## 2020-04-03 RX ORDER — ACETAMINOPHEN 500 MG
1000 TABLET ORAL
Qty: 60 TAB | Refills: 1 | Status: SHIPPED
Start: 2020-04-03 | End: 2021-09-09

## 2020-04-03 RX ORDER — HYDROMORPHONE HYDROCHLORIDE 1 MG/ML
0.5 INJECTION, SOLUTION INTRAMUSCULAR; INTRAVENOUS; SUBCUTANEOUS
Status: DISCONTINUED | OUTPATIENT
Start: 2020-04-03 | End: 2020-04-03 | Stop reason: HOSPADM

## 2020-04-03 RX ORDER — ONDANSETRON 2 MG/ML
4 INJECTION INTRAMUSCULAR; INTRAVENOUS AS NEEDED
Status: DISCONTINUED | OUTPATIENT
Start: 2020-04-03 | End: 2020-04-03 | Stop reason: HOSPADM

## 2020-04-03 RX ORDER — LOPERAMIDE HYDROCHLORIDE 2 MG/1
CAPSULE ORAL
COMMUNITY
End: 2021-09-09

## 2020-04-03 RX ORDER — ALBUTEROL SULFATE 0.83 MG/ML
2.5 SOLUTION RESPIRATORY (INHALATION) AS NEEDED
Status: DISCONTINUED | OUTPATIENT
Start: 2020-04-03 | End: 2020-04-03 | Stop reason: HOSPADM

## 2020-04-03 RX ORDER — DIPHENHYDRAMINE HYDROCHLORIDE 50 MG/ML
12.5 INJECTION, SOLUTION INTRAMUSCULAR; INTRAVENOUS AS NEEDED
Status: DISCONTINUED | OUTPATIENT
Start: 2020-04-03 | End: 2020-04-03 | Stop reason: HOSPADM

## 2020-04-03 RX ORDER — TRAMADOL HYDROCHLORIDE 50 MG/1
50 TABLET ORAL
Status: DISCONTINUED | OUTPATIENT
Start: 2020-04-03 | End: 2020-04-03 | Stop reason: HOSPADM

## 2020-04-03 RX ORDER — KETOROLAC TROMETHAMINE 10 MG/1
10 TABLET, FILM COATED ORAL
Qty: 12 TAB | Refills: 0 | Status: SHIPPED | OUTPATIENT
Start: 2020-04-03 | End: 2021-09-09

## 2020-04-03 RX ORDER — CHOLECALCIFEROL (VITAMIN D3) 50 MCG
CAPSULE ORAL
COMMUNITY
End: 2021-09-09

## 2020-04-03 RX ORDER — ONDANSETRON 2 MG/ML
INJECTION INTRAMUSCULAR; INTRAVENOUS AS NEEDED
Status: DISCONTINUED | OUTPATIENT
Start: 2020-04-03 | End: 2020-04-03 | Stop reason: HOSPADM

## 2020-04-03 RX ORDER — ONDANSETRON 4 MG/1
4 TABLET, ORALLY DISINTEGRATING ORAL
Qty: 12 TAB | Refills: 1 | Status: SHIPPED
Start: 2020-04-03 | End: 2022-09-29

## 2020-04-03 RX ORDER — SODIUM CHLORIDE, SODIUM LACTATE, POTASSIUM CHLORIDE, CALCIUM CHLORIDE 600; 310; 30; 20 MG/100ML; MG/100ML; MG/100ML; MG/100ML
125 INJECTION, SOLUTION INTRAVENOUS CONTINUOUS
Status: DISCONTINUED | OUTPATIENT
Start: 2020-04-03 | End: 2020-04-03 | Stop reason: HOSPADM

## 2020-04-03 RX ORDER — TRAMADOL HYDROCHLORIDE 50 MG/1
50-100 TABLET ORAL
Qty: 20 TAB | Refills: 0 | Status: SHIPPED
Start: 2020-04-03 | End: 2020-04-06

## 2020-04-03 RX ORDER — SODIUM CHLORIDE, SODIUM LACTATE, POTASSIUM CHLORIDE, CALCIUM CHLORIDE 600; 310; 30; 20 MG/100ML; MG/100ML; MG/100ML; MG/100ML
150 INJECTION, SOLUTION INTRAVENOUS CONTINUOUS
Status: DISCONTINUED | OUTPATIENT
Start: 2020-04-03 | End: 2020-04-03 | Stop reason: HOSPADM

## 2020-04-03 RX ORDER — FENTANYL CITRATE 50 UG/ML
INJECTION, SOLUTION INTRAMUSCULAR; INTRAVENOUS AS NEEDED
Status: DISCONTINUED | OUTPATIENT
Start: 2020-04-03 | End: 2020-04-03 | Stop reason: HOSPADM

## 2020-04-03 RX ORDER — GLYCOPYRROLATE 0.2 MG/ML
INJECTION INTRAMUSCULAR; INTRAVENOUS AS NEEDED
Status: DISCONTINUED | OUTPATIENT
Start: 2020-04-03 | End: 2020-04-03 | Stop reason: HOSPADM

## 2020-04-03 RX ORDER — CIPROFLOXACIN 500 MG/1
500 TABLET ORAL 2 TIMES DAILY
Qty: 20 TAB | Refills: 0 | Status: SHIPPED | OUTPATIENT
Start: 2020-04-03 | End: 2021-09-09

## 2020-04-03 RX ORDER — SUCCINYLCHOLINE CHLORIDE 20 MG/ML
INJECTION INTRAMUSCULAR; INTRAVENOUS AS NEEDED
Status: DISCONTINUED | OUTPATIENT
Start: 2020-04-03 | End: 2020-04-03 | Stop reason: HOSPADM

## 2020-04-03 RX ORDER — NEOSTIGMINE METHYLSULFATE 1 MG/ML
INJECTION, SOLUTION INTRAVENOUS AS NEEDED
Status: DISCONTINUED | OUTPATIENT
Start: 2020-04-03 | End: 2020-04-03 | Stop reason: HOSPADM

## 2020-04-03 RX ORDER — ROCURONIUM BROMIDE 10 MG/ML
INJECTION, SOLUTION INTRAVENOUS AS NEEDED
Status: DISCONTINUED | OUTPATIENT
Start: 2020-04-03 | End: 2020-04-03 | Stop reason: HOSPADM

## 2020-04-03 RX ORDER — PSYLLIUM HUSK 0.4 G
CAPSULE ORAL
COMMUNITY
End: 2021-09-09

## 2020-04-03 RX ORDER — CEFAZOLIN SODIUM 1 G/3ML
INJECTION, POWDER, FOR SOLUTION INTRAMUSCULAR; INTRAVENOUS AS NEEDED
Status: DISCONTINUED | OUTPATIENT
Start: 2020-04-03 | End: 2020-04-03 | Stop reason: HOSPADM

## 2020-04-03 RX ORDER — LIDOCAINE HYDROCHLORIDE 20 MG/ML
INJECTION, SOLUTION EPIDURAL; INFILTRATION; INTRACAUDAL; PERINEURAL AS NEEDED
Status: DISCONTINUED | OUTPATIENT
Start: 2020-04-03 | End: 2020-04-03 | Stop reason: HOSPADM

## 2020-04-03 RX ORDER — PROPOFOL 10 MG/ML
INJECTION, EMULSION INTRAVENOUS AS NEEDED
Status: DISCONTINUED | OUTPATIENT
Start: 2020-04-03 | End: 2020-04-03 | Stop reason: HOSPADM

## 2020-04-03 RX ADMIN — LIDOCAINE HYDROCHLORIDE 60 MG: 20 INJECTION, SOLUTION INTRAVENOUS at 16:51

## 2020-04-03 RX ADMIN — FENTANYL CITRATE 50 MCG: 50 INJECTION, SOLUTION INTRAMUSCULAR; INTRAVENOUS at 16:51

## 2020-04-03 RX ADMIN — GLYCOPYRROLATE 0.4 MG: 0.2 INJECTION, SOLUTION INTRAMUSCULAR; INTRAVENOUS at 17:23

## 2020-04-03 RX ADMIN — HYDROMORPHONE HYDROCHLORIDE 0.5 MG: 1 INJECTION, SOLUTION INTRAMUSCULAR; INTRAVENOUS; SUBCUTANEOUS at 17:51

## 2020-04-03 RX ADMIN — TRAMADOL HYDROCHLORIDE 50 MG: 50 TABLET ORAL at 18:56

## 2020-04-03 RX ADMIN — ROCURONIUM BROMIDE 20 MG: 50 INJECTION, SOLUTION INTRAVENOUS at 16:51

## 2020-04-03 RX ADMIN — MIDAZOLAM HYDROCHLORIDE 2 MG: 2 INJECTION, SOLUTION INTRAMUSCULAR; INTRAVENOUS at 16:46

## 2020-04-03 RX ADMIN — DEXAMETHASONE SODIUM PHOSPHATE 4 MG: 4 INJECTION, SOLUTION INTRAMUSCULAR; INTRAVENOUS at 17:09

## 2020-04-03 RX ADMIN — ONDANSETRON 4 MG: 2 INJECTION INTRAMUSCULAR; INTRAVENOUS at 16:59

## 2020-04-03 RX ADMIN — SUCCINYLCHOLINE CHLORIDE 100 MG: 20 INJECTION, SOLUTION INTRAMUSCULAR; INTRAVENOUS; PARENTERAL at 16:51

## 2020-04-03 RX ADMIN — CEFAZOLIN SODIUM 2 G: 1 INJECTION, POWDER, FOR SOLUTION INTRAMUSCULAR; INTRAVENOUS at 17:09

## 2020-04-03 RX ADMIN — PROMETHAZINE HYDROCHLORIDE 6.25 MG: 25 INJECTION INTRAMUSCULAR; INTRAVENOUS at 18:33

## 2020-04-03 RX ADMIN — SODIUM CHLORIDE, POTASSIUM CHLORIDE, SODIUM LACTATE AND CALCIUM CHLORIDE: 600; 310; 30; 20 INJECTION, SOLUTION INTRAVENOUS at 16:46

## 2020-04-03 RX ADMIN — Medication 3 MG: at 17:23

## 2020-04-03 RX ADMIN — PROPOFOL 150 MG: 10 INJECTION, EMULSION INTRAVENOUS at 16:51

## 2020-04-03 NOTE — OP NOTES
Operative Note    Patient: Richard Hinson  YOB: 1970  MRN: [id number]    Date of Procedure: 4/3/2020     Pre-Op Diagnosis: Appendicitis    Post-Op Diagnosis: Same as preoperative diagnosis. Epiploic sigmoid appendigitis    Procedure(s):  APPENDECTOMY LAPAROSCOPIC (ESSENTIAL)      Surgeon(s):  Shayan Siddiqui MD    Surgical Assistant: None    Anesthesia: General     Estimated Blood Loss (mL): Minimal    Complications: None    Specimens:   ID Type Source Tests Collected by Time Destination   1 : Appendix and sigmoid epiploica Preservative Appendix  Shayan Siddiqui MD 4/3/2020 1716 Pathology        Implants: * No implants in log *    Drains: * No LDAs found *    Findings: Distended, inflamed appendix. Torsed sigmoid epiploic fat    Electronically Signed by Janette Rey MD on 4/3/2020 at 5:35 PM      Procedure:  After consent was obtained, the patient was taken back to the operating room and placed in a supine position. After induction of general anesthesia and endotracheal intubation, the abdomen was prepped and draped in normal sterile fashion and left arm tucked. The patient is being treated for appendicitis with therapeutic antibiotics. Appropriate pre-incision antibiotic therapy was verified in the EMR as given with the team during pre-incision time out, and antibiotics were given 30 minutes prior to skin incision. Pre-incision subcutaneous local analgesia was instilled in the pre-planned port site tissues. The first incision was made a minute later. Through the first 5mm right para-umbilical horizontal incision, the abdomen was entered under direct camera vision with a 5mm tissue dissection port. Pneumoperitoneum was established and maintained at 15mm Hg. I looked to the underlying bowel or omentum adjacent to the entry point for injury and it was undisturbed. 5mm horizontal left lower quadrant skin incision was made.   A 5mm blunt dissecting port was placed through the left lower quadrant fascia under controlled, direct laparoscopic vision. The final 12mm left lower quadrant skin incision was made, and a 12mm blunt tissue dissecting port was placed through the left lower quadrant fascia under controlled, direct laparoscopic vision. A torsed sigmoid epiploica was seen in direct view in the left lower quadrant. This was taken with electrocautery and passed off. The non-perforated appendix was identified in the right lower quadrant after rotating the cecum away from the retroperitoneum. The soft appendix base was clearly identified. The junction of the terminal ileum to the cecum and the ascending colon was also identified for further confirmation. The meso-appendix was grasped and lifted upwards to clearly identify the base and the junction with the cecum. The meso appendix was divided with vessel seal energy. The base of the appendix was transected using a 45mm blue load stapler. The appendix was retrieved through the right lower quadrant port using an endo catch retrieval bag. Reexamination of the right lower quadrant revealed no bleeding. Exploration of all four abdominal quadrants revealed unremarkable anatomy. Hemostasis was satisfactory. Instrument, sponge and needle counts were correct. The ports were removed under direct vision confirming no rectus bleeding, peritoneal bleeding or injury to intraabdominal structures. The pneumoperitoneum was maximally evacuated. Exparel expanded with 0.5% marcaine local anesthetic was injected into the fascia. The 12mm left lower quadrant port site fascial defect was closed with a single 0-Vicryl figure of eight stitch. All skin was closed with subcuticular 4-0 Monocryl, steristrips and tegaderm. The patient tolerated procedure well and returned to the post-anesthesia recovery room in stable condition. I called to discuss the findings with her mother Carla Segovia by cell phone.       Nilda Linda MD

## 2020-04-03 NOTE — H&P
Dictated note from office today unchanged. Paper history and physical on chart. No change to plan.      Etienne Smith MD

## 2020-04-03 NOTE — DISCHARGE INSTRUCTIONS
Patient Education        Appendectomy: What to Expect at Home  Your Recovery    Your doctor removed your appendix either by making many small cuts, called incisions, in your belly (laparoscopic surgery) or through open surgery. In open surgery, the doctor makes one large incision. The incisions leave scars that usually fade over time. After your surgery, it is normal to feel weak and tired for several days after you return home. Your belly may be swollen and may be painful. If you had laparoscopic surgery, you may have pain in your shoulder for about 24 hours. You may also feel sick to your stomach and have diarrhea, constipation, gas, or a headache. This usually goes away in a few days. Your recovery time depends on the type of surgery you had. If you had laparoscopic surgery, you will probably be able to return to work or a normal routine 1 to 3 weeks after surgery. If you had an open surgery, it may take 2 to 4 weeks. If your appendix ruptured, you may have a drain in your incision. Your body will work fine without an appendix. You will not have to make any changes in your diet or lifestyle. This care sheet gives you a general idea about how long it will take for you to recover. But each person recovers at a different pace. Follow the steps below to get better as quickly as possible. How can you care for yourself at home? Activity    · Rest when you feel tired. Getting enough sleep will help you recover.     · Try to walk each day. Start by walking a little more than you did the day before. Bit by bit, increase the amount you walk. Walking boosts blood flow and helps prevent pneumonia and constipation.     · For about 2 weeks, avoid lifting anything that would make you strain.  This may include a child, heavy grocery bags and milk containers, a heavy briefcase or backpack, cat litter or dog food bags, or a vacuum .     · Avoid strenuous activities, such as bicycle riding, jogging, weight lifting, or aerobic exercise, until your doctor says it is okay.     · You may be able to take showers (unless you have a drain near your incision) 24 to 48 hours after surgery. Pat the incision dry. Do not take a bath for the first 2 weeks, or until your doctor tells you it is okay. If you have a drain near your incision, follow your doctor's instructions.     · You may drive when you are no longer taking pain medicine and can quickly move your foot from the gas pedal to the brake. You must also be able to sit comfortably for a long period of time, even if you do not plan on going far. You might get caught in traffic.     · You will probably be able to go back to work in 1 to 3 weeks. If you had an open surgery, it may take 3 to 4 weeks.     · Your doctor will tell you when you can have sex again. Diet    · You can eat your normal diet. If your stomach is upset, try bland, low-fat foods like plain rice, broiled chicken, toast, and yogurt.     · Drink plenty of fluids (unless your doctor tells you not to).     · You may notice that your bowel movements are not regular right after your surgery. This is common. Try to avoid constipation and straining with bowel movements. You may want to take a fiber supplement every day. If you have not had a bowel movement after a couple of days, ask your doctor about taking a mild laxative. Medicines    · Your doctor will tell you if and when you can restart your medicines. He or she will also give you instructions about taking any new medicines.     · If you take aspirin or some other blood thinner, ask your doctor if and when to start taking it again. Make sure that you understand exactly what your doctor wants you to do.     · If your appendix ruptured, you will need to take antibiotics. Take them as directed. Do not stop taking them just because you feel better. You need to take the full course of antibiotics.     · Be safe with medicines. Take pain medicines exactly as directed. ?  If the doctor gave you a prescription medicine for pain, take it as prescribed. ? If you are not taking a prescription pain medicine, take an over-the-counter medicine such as acetaminophen (Tylenol), ibuprofen (Advil, Motrin), or naproxen (Aleve). Read and follow all instructions on the label. ? Do not take two or more pain medicines at the same time unless the doctor told you to. Many pain medicines have acetaminophen, which is Tylenol. Too much Tylenol can be harmful.     · If you think your pain medicine is making you sick to your stomach:  ? Take your medicine after meals (unless your doctor has told you not to). ? Ask your doctor for a different pain medicine. Incision care    · If you had an open surgery, you may have staples in your incision. The doctor will take these out in 7 to 10 days.     · If you have strips of tape on the incision, leave the tape on for a week or until it falls off.     · You may wash the area with warm, soapy water 24 to 48 hours after your surgery, unless your doctor tells you not to. Pat the area dry.     · Keep the area clean and dry. You may cover it with a gauze bandage if it weeps or rubs against clothing. Change the bandage every day.     · If your appendix ruptured, you may have an incision with packing in it. Change the packing as often as your doctor tells you to. ? Packing changes may hurt at first. Taking pain medicine about half an hour before you change the dressing can help. ? If your dressing sticks to your wound, try soaking it with warm water for about 10 minutes before you remove it. You can do this in the shower or by placing a wet washcloth over the dressing. ? Remove the old packing and flush the incision with water. Gently pat the top area dry. ? The size of the incision determines how much gauze you need to put inside. Fold the gauze over once, but do not wad it up so that it hurts. Put it in the wound carefully.  You want to keep the sides of the wound from touching. A cotton swab may help you push the gauze in as needed. ? Put a gauze pad over the wound, and tape it down. ? You may notice greenish gray fluid seeping from your wound as you start to heal. This is normal. It is a sign that your wound is healing. Follow-up care is a key part of your treatment and safety. Be sure to make and go to all appointments, and call your doctor if you are having problems. It's also a good idea to know your test results and keep a list of the medicines you take. When should you call for help? Call 911 anytime you think you may need emergency care. For example, call if:    · You passed out (lost consciousness).     · You are short of breath. Pati Jubilee your doctor now or seek immediate medical care if:    · You are sick to your stomach and cannot drink fluids.     · You cannot pass stools or gas.     · You have pain that does not get better when you take your pain medicine.     · You have signs of infection, such as:  ? Increased pain, swelling, warmth, or redness. ? Red streaks leading from the wound. ? Pus draining from the wound. ? A fever.     · You have loose stitches, or your incision comes open.     · Bright red blood has soaked through the bandage over your incision.     · You have signs of a blood clot in your leg (called a deep vein thrombosis), such as:  ? Pain in your calf, back of knee, thigh, or groin. ? Redness and swelling in your leg or groin.    Watch closely for any changes in your health, and be sure to contact your doctor if you have any problems. Where can you learn more? Go to http://mariah-katie.info/  Enter X801 in the search box to learn more about \"Appendectomy: What to Expect at Home. \"  Current as of: August 11, 2019Content Version: 12.4  © 6920-3518 [employer], [employer]orporated. Care instructions adapted under license by [employer] (which disclaims liability or warranty for this information).  If you have questions about a medical condition or this instruction, always ask your healthcare professional. [address] any warranty or liability for your use of this information. DISCHARGE SUMMARY from your Nurse    The following personal items collected during your admission are returned to you:   Dental Appliance: Dental Appliances: None  Vision:    Hearing Aid:    Jewelry:    Clothing:    Other Valuables:    Valuables sent to safe:      PATIENT INSTRUCTIONS:    After general anesthesia or intravenous sedation, for 24 hours or while taking prescription Narcotics:  · Limit your activities  · Do not drive and operate hazardous machinery  · Do not make important personal or business decisions  · Do  not drink alcoholic beverages  · If you have not urinated within 8 hours after discharge, please contact your surgeon on call. Report the following to your surgeon:  · Excessive pain, swelling, redness or odor of or around the surgical area  · Temperature over 100.5  · Nausea and vomiting lasting longer than 4 hours or if unable to take medications  · Any signs of decreased circulation or nerve impairment to extremity: change in color, persistent  numbness, tingling, coldness or increase pain  · Any questions    COUGH AND DEEP BREATHE    Breathing deep and coughing are very important exercises to do after surgery. Deep breathing and coughing open the little air tubes and air sacks in your lungs. You take deep breaths every day. You may not even notice - it is just something you do when you sigh or yawn. It is a natural exercise you do to keep these air passages open. After surgery, take deep breaths and cough, on purpose. Coughing and deep breathing help prevent bronchitis and pneumonia after surgery. If you had chest or belly surgery, use a pillow as a \"hug buddy\" and hold it tightly to your chest or belly when you cough.      DIRECTIONS:  · Take 10 to 15 slow deep breaths every hour while awake. · Breathe in deeply, and hold it for 2 seconds. · Exhale slowly through puckered lips, like blowing up a balloon. · After every 4th or 5th deep breath, hug your pillow to your chest or belly and give a hard, deep cough. Yes, it will probably hurt. But doing this exercise is very important part of healing after surgery. Take your pain medicine to help you do this exercise without too much pain. IF YOU HAVE BEEN DIAGNOSED WITH SLEEP APNEA, PLEASE USE YOUR SLEEP APNEA DEVICE OR CPAP MACHINE WHEN YOU INTEND TO NAP AFTER TAKING PAIN MEDICATION. Ankle Pumps    Ankle pumps increase the circulation of oxygenated blood to your lower extremities and decrease your risk for circulation problems such as blood clots. They also stretch the muscles, tendons and ligaments in your foot and ankle, and prevent joint contracture in the ankle and foot, especially after surgeries on the legs. It is important to do ankle pump exercises regularly after surgery because immobility increases your risk for developing a blood clot. Your doctor may also have you take an Aspirin for the next few days as well. If your doctor did not ask you to take an Aspirin, consult with him before starting Aspirin therapy on your own. Slowly point your foot forward, feeling the muscles on the top of your lower leg stretch, and hold this position for 5 seconds. Next, pull your foot back toward you as far as possible, stretching the calf muscles, and hold that position for 5 seconds. Repeat with the other foot. Perform 10 repetitions every hour while awake for both ankles if possible (down and then up with the foot once is one repetition). You should feel gentle stretching of the muscles in your lower leg when doing this exercise. If you feel pain, or your range of motion is limited, don't  Push too hard. Only go the limit your joint and muscles will let you go.   If you have increasing pain, progressively worsening leg warmth or swelling, STOP the exercise and call your doctor. Below is information about the medications your doctor is prescribing after your visit:    Other information in your discharge envelope:  []     PRESCRIPTIONS  []     SCHOOL/WORK NOTE  []     INFECTION PREVENTION  []     [address]  []     REGIONAL NERVE BLOCK ON QUE PAMPHLET   []     EXPAREL  []     HANDICAP APPLICATION         These are general instructions for a healthy lifestyle:    *  Please give a list of your current medications to your Primary Care Provider. *  Please update this list whenever your medications are discontinued, doses are      changed, or new medications (including over-the-counter products) are added. *  Please carry medication information at all times in case of emergency situations. About Smoking  No smoking / No tobacco products / Avoid exposure to second hand smoke    Surgeon General's Warning:  Quitting smoking now greatly reduces serious risk to your health. Obesity, smoking, and sedentary lifestyle greatly increases your risk for illness and disease. A healthy diet, regular physical exercise & weight monitoring are important for maintaining a healthy lifestyle. Congestive Heart Failure  You may be retaining fluid if you have a history of heart failure or if you experience any of the following symptoms:  Weight gain of 3 pounds or more overnight or 5 pounds in a week, increased swelling in our hands or feet or shortness of breath while lying flat in bed. Please call your doctor as soon as you notice any of these symptoms; do not wait until your next office visit. Recognize signs and symptoms of STROKE:  F - face looks uneven  A - arms unable to move or move even  S - speech slurred or non-existent  T - time-call 911 as soon as signs and symptoms begin-DO NOT go         Back to bed or wait to see if you get better-TIME IS BRAIN.       Warning signs of HEART ATTACK  Call 911 if you have these symptoms    · Chest discomfort. Most heart attacks involve discomfort in the center of the chest that lasts more than a few minutes, or that goes away and comes back. It can feel like uncomfortable pressure, squeezing, fullness, or pain. · Discomfort in other areas of the upper body. Symptoms can include pain or discomfort in one or both        Arms, the back, neck, jaw, or stomach. ·  Shortness of breath with or without chest discomfort. · Other signs may include breaking out in a cold sweat, nausea, or lightheadedness    Don't wait more than five minutes to call 911 - MINUTES MATTER! Fast action can save your life. Calling 911 is almost always the fastest way to get lifesaving treatment. Emergency Medical Services staff can begin treatment when they arrive - up to an hour sooner than if someone gets to the hospital by car. CARMEN AGUILERA MEDICATION AND SIDE EFFECT GUIDE    The [employer] MEDICATION AND SIDE EFFECT GUIDE was provided to the PATIENT AND CARE PROVIDER.   Information provided includes instruction about drug purpose and common side effects for the following medications:    · Tramadol  · Toradol  · Zofran  · Tylenol

## 2020-04-03 NOTE — PROGRESS NOTES
Consent: Jeromy Duran, who was seen by synchronous (real-time) audio-video technology, and/or her healthcare decision maker, is aware that this patient-initiated, Telehealth encounter on 4/3/2020 is a billable service, with coverage as determined by her insurance carrier. She is aware that she may receive a bill and has provided verbal consent to proceed: YES      712  Assessment & Plan:   Diagnoses and all orders for this visit:    1. Right lower quadrant abdominal pain  Patient with concerning right lower quadrant pain and CT history consistent with a low-grade appendicitis. Patient is currently taking Flagyl will add Cipro for further coverage. She is currently in [place]. We have reached out to general surgery to further evaluate in [facility]. Patient does not want to go back to [facility]    2. Diarrhea, unspecified type  Continue fluids  Brat diet  Avoid lactose or milk products and wheat for now  Stool cultures are pending from [facility]  -     ciprofloxacin HCl (CIPRO) 500 mg tablet; Take 1 Tab by mouth two (2) times a day. Subjective:   Jeromy Duran is a 54 y.o. female who was seen for Abdominal Pain and Diarrhea (onset 2-3 weeks)        Patient is a 54 y.o. female with a history  of diarrhea over the past 3 weeks. Patient reports having approximately 7 loose stools per day. The stool contains no blood or greasy droplets. Patient reports associated nausea and crampy, periumbilical pain, but denies any vomiting, fevers, or chills. Patient denies any recent sick exposures, travel outside the [place] or consumption of well water. Patient denies any recent change in medications. No recent colonoscopy. She has not been on antibiotics. She has tried Imodium and probiotics for the past week but no improvement in symptoms. She tried omeprazole but also no relief.   She is having 7 out of 10 right lower quadrant sharp pain with standing or walking. At rest it is a 4 out of 10 sharp pain. She notes the rest of her abdomen is diffusely painful at 4 out of 10. Patient reports she went to [facility] emergency room on Tuesday, March 31. A CT of the abdomen pelvis revealed an appendicitis. She was given pain medications and was transitioned to [facility].     At [facility] patient was evaluated by surgeon Dr. Asif Perez who did not think surgery was indicated at this time. He referred for a GI evaluation and was seen by Dr. Felix Pierce. A colonoscopy was not offered due to Covid precautions. She was told she could stay in the hospital and continue to be monitored. She was discharged with Flagyl 500 mg 3 times daily. She has received 2 doses and no improvement in symptoms    She is drinking fluids:  Water, diet ginger ale, gatordae, coffee  Saltines and crackers and Jell-O  every 1-2 hours voiding, trying to stay hydated  Chicken and rice x week  balogne and cheese sandwich with lettuce x1 with exacerbation of symptoms  Bananas rice   BRAt    Stool cultures were taken at [facility] and are pending      Cat in video and healthy  No abx  No hx of colonscopy    Stool:  Light brown, yellow, squash skins in it  Squirts out with teaspoon of dust   No blood, no blood at [facility] been tested  Not lost weight over 3 weeks possibly a pound              Current Outpatient Medications   Medication Sig    B.infantis-B.ani-B.long-B.bifi (Probiotic 4X) 10-15 mg TbEC Take  by mouth.  psyllium husk (Metamucil) 0.4 gram cap Take  by mouth.  loperamide (IMODIUM) 2 mg capsule Take  by mouth.  metroNIDAZOLE (Flagyl) 250 mg tablet Take  by mouth three (3) times daily.  butalbital-acetaminophen-caffeine (FIORICET, ESGIC) -40 mg per tablet Take 1 Tab by mouth every six (6) hours as needed for Headache.     promethazine (PHENERGAN) 12.5 mg tablet Take 1 Tab by mouth every six (6) hours as needed for Nausea.  omeprazole (PRILOSEC) 40 mg capsule Take 1 Cap by mouth daily.  fluticasone-salmeterol (ADVAIR) 250-50 mcg/dose diskus inhaler Take 1 Puff by inhalation two (2) times a day. MUST RINSE MOUTH AFTER USE    methocarbamol (ROBAXIN) 500 mg tablet Take 1-2 tabs po at night only as needed for trapezius pain    montelukast (SINGULAIR) 10 mg tablet Take 1 Tab by mouth daily.  VIVELLE-DOT 0.1 mg/24 hr     fluticasone (FLONASE) 50 mcg/actuation nasal spray 2 sprays by Both Nostrils route daily. No current facility-administered medications for this visit. Allergies   Allergen Reactions    Amitriptyline Other (comments)     Significant weight gain     Subjective    Past Medical History:   Diagnosis Date    Anxiety     GERD (gastroesophageal reflux disease)     Headache        ROS  All other systems reviewed and negative, unless mentioned in HPI    Objective:   Vital Signs: (As obtained by patient/caregiver at home)  There were no vitals taken for this visit.      [INSTRUCTIONS:  \"[x]\" Indicates a positive item  \"[]\" Indicates a negative item  -- DELETE ALL ITEMS NOT EXAMINED]    Constitutional: [x] Appears well-developed and well-nourished [x] No apparent distress      [x] Abnormal -patient has some distress with movement on video when walking and standing    Mental status: [x] Alert and awake  [x] Oriented to person/place/time [x] Able to follow commands    [] Abnormal -     Eyes:   EOM    [x]  Normal    [] Abnormal -   Sclera  [x]  Normal    [] Abnormal -          Discharge [x]  None visible   [] Abnormal -     HENT: [x] Normocephalic, atraumatic  [] Abnormal -   [x] Mouth/Throat: Mucous membranes are moist    External Ears [x] Normal  [] Abnormal -    Neck: [x] No visualized mass [] Abnormal -     Pulmonary/Chest: [x] Respiratory effort normal   [x] No visualized signs of difficulty breathing or respiratory distress        [] Abnormal -      Musculoskeletal:   [x] Normal gait with no signs of ataxia         [x] Normal range of motion of neck        [] Abnormal -     Neurological:        [x] No Facial Asymmetry (Cranial nerve 7 motor function) (limited exam due to video visit)          [x] No gaze palsy        [] Abnormal -          Skin:        [x] No significant exanthematous lesions or discoloration noted on facial skin         [] Abnormal -            Psychiatric:       [x] Normal Affect [] Abnormal -        [x] No Hallucinations    Other pertinent observable physical exam findings:-  Abdomen  Distended on view appears to have some tenderness when deep palpation by patient's hand on the right lower quadrant      We discussed the expected course, resolution and complications of the diagnosis(es) in detail. Medication risks, benefits, costs, interactions, and alternatives were discussed as indicated. I advised her to contact the office if her condition worsens, changes or fails to improve as anticipated. She expressed understanding with the diagnosis(es) and plan. Jason Wesley is a 54 y.o. female being evaluated by a video visit encounter for concerns as above. A caregiver was present when appropriate. Due to this being a TeleHealth encounter (During VEKDJ-89 public health emergency), evaluation of the following organ systems was limited: Vitals/Constitutional/EENT/Resp/CV/GI//MS/Neuro/Skin/Heme-Lymph-Imm. Pursuant to the emergency declaration under the [facility], [facility]5 waiver authority and the [employer] Act, this Virtual  Visit was conducted, with patient's (and/or legal guardian's) consent, to reduce the patient's risk of exposure to COVID-19 and provide necessary medical care. Services were provided through a video synchronous discussion virtually to substitute for in-person clinic visit. Patient and provider were located at their individual homes.     Zahra Maya MD Afshan     I spent 40 minutes with this patient and greater than 50% of the time was spent in management and counseling with regards to her abdominal pain and diarrhea symptoms. Concerning for appendicitis. Will initiate Cipro. Anticipate an office visit with general surgery for further evaluation.

## 2020-04-03 NOTE — ANESTHESIA PREPROCEDURE EVALUATION
Relevant Problems   No relevant active problems       Anesthetic History     PONV          Review of Systems / Medical History  Patient summary reviewed and nursing notes reviewed    Pulmonary                Comments: Former smoker   Neuro/Psych             Comments: Chronic Insomnia  Anxiety/depression Cardiovascular                  Exercise tolerance: >4 METS     GI/Hepatic/Renal     GERD           Endo/Other  Within defined limits           Other Findings              Physical Exam    Airway  Mallampati: II    Neck ROM: normal range of motion   Mouth opening: Normal     Cardiovascular    Rhythm: regular  Rate: normal         Dental  No notable dental hx       Pulmonary  Breath sounds clear to auscultation               Abdominal         Other Findings            Anesthetic Plan    ASA: 2, emergent  Anesthesia type: general            Anesthetic plan and risks discussed with: Patient      Informed consent obtained.

## 2020-04-03 NOTE — BRIEF OP NOTE
Brief Postoperative Note    Patient: Kyrie Loera  YOB: 1970  MRN: [id number]    Date of Procedure: 4/3/2020     Pre-Op Diagnosis: Appendicitis    Post-Op Diagnosis: Same as preoperative diagnosis. Epiploic sigmoid appendigitis    Procedure(s):  APPENDECTOMY LAPAROSCOPIC (ESSENTIAL)      Surgeon(s):  Nikki Glez MD    Surgical Assistant: None    Anesthesia: General     Estimated Blood Loss (mL): Minimal    Complications: None    Specimens:   ID Type Source Tests Collected by Time Destination   1 : Appendix and sigmoid epiploica Preservative Appendix  Nikki Glez MD 4/3/2020 1716 Pathology        Implants: * No implants in log *    Drains: * No LDAs found *    Findings: Distended, inflamed appendix.   Torsed sigmoid epiploic fat    Electronically Signed by Aletha Ramos MD on 4/3/2020 at 5:35 PM

## 2020-04-03 NOTE — DISCHARGE SUMMARY
Discharge Summary    Patient: Elmo Quijano               Sex: female          DOA: 4/3/2020  2:26 PM       YOB: 1970      Age:  54 y.o.        LOS:  LOS: 0 days                Discharge Date:      Admission Diagnoses: Appendicitis, with failure of IV and oral antibiotics    Discharge Diagnoses:  Same, epiploic appendigitis    Procedure:  Procedure(s):  APPENDECTOMY LAPAROSCOPIC (ESSENTIAL)    Discharge Condition: Good    Hospital Course: Unremarkable operative procedure. Discharge to home in stable condition. Consults: None    Significant Diagnostic Studies: See full electronic record. Discharge Medications:     Current Discharge Medication List      START taking these medications    Details   traMADoL (ULTRAM) 50 mg tablet Take 1-2 Tabs by mouth every six (6) hours as needed for Pain for up to 3 days. Max Daily Amount: 400 mg. Indications: pain  Qty: 20 Tab, Refills: 0    Associated Diagnoses: Acute appendicitis with localized peritonitis, without perforation, abscess, or gangrene      acetaminophen (Tylenol Extra Strength) 500 mg tablet Take 2 Tabs by mouth every eight (8) hours as needed for Pain. Indications: pain  Qty: 60 Tab, Refills: 1    Associated Diagnoses: Acute appendicitis with localized peritonitis, without perforation, abscess, or gangrene      ondansetron (ZOFRAN ODT) 4 mg disintegrating tablet Take 1 Tab by mouth every six (6) hours as needed for Nausea. Indications: prevent nausea and vomiting after surgery  Qty: 12 Tab, Refills: 1      ketorolac (TORADOL) 10 mg tablet Take 1 Tab by mouth every six (6) hours as needed for Pain. Qty: 12 Tab, Refills: 0    Associated Diagnoses: Acute appendicitis with localized peritonitis, without perforation, abscess, or gangrene         CONTINUE these medications which have NOT CHANGED    Details   loperamide (IMODIUM) 2 mg capsule Take  by mouth.      metroNIDAZOLE (Flagyl) 250 mg tablet Take  by mouth three (3) times daily. butalbital-acetaminophen-caffeine (FIORICET, ESGIC) -40 mg per tablet Take 1 Tab by mouth every six (6) hours as needed for Headache. Qty: 12 Tab, Refills: 0    Associated Diagnoses: Intractable migraine with aura without status migrainosus      omeprazole (PRILOSEC) 40 mg capsule Take 1 Cap by mouth daily. Qty: 90 Cap, Refills: 0    Comments: Please make a follow up annual to check labs. Associated Diagnoses: Routine general medical examination at a health care facility      B.infantis-B.ani-B.long-B.bifi (Probiotic 4X) 10-15 mg TbEC Take  by mouth. psyllium husk (Metamucil) 0.4 gram cap Take  by mouth. ciprofloxacin HCl (CIPRO) 500 mg tablet Take 1 Tab by mouth two (2) times a day. Qty: 20 Tab, Refills: 0      promethazine (PHENERGAN) 12.5 mg tablet Take 1 Tab by mouth every six (6) hours as needed for Nausea. Qty: 10 Tab, Refills: 0    Associated Diagnoses: Intractable migraine with aura without status migrainosus      fluticasone-salmeterol (ADVAIR) 250-50 mcg/dose diskus inhaler Take 1 Puff by inhalation two (2) times a day. MUST RINSE MOUTH AFTER USE  Qty: 1 Each, Refills: 0    Associated Diagnoses: Bronchitis      methocarbamol (ROBAXIN) 500 mg tablet Take 1-2 tabs po at night only as needed for trapezius pain  Qty: 30 Tab, Refills: 0    Associated Diagnoses: Musculoskeletal pain      montelukast (SINGULAIR) 10 mg tablet Take 1 Tab by mouth daily. Qty: 30 Tab, Refills: 4      VIVELLE-DOT 0.1 mg/24 hr       fluticasone (FLONASE) 50 mcg/actuation nasal spray 2 sprays by Both Nostrils route daily. Qty: 1 Bottle, Refills: 0    Associated Diagnoses: Sinusitis           Activity/Diet/Wound Care: See patient administered discharge instructions.     Follow-up: 2 weeks    Maya Nyhan, MD  [facility]  Office:  [phone]  Fax:  [phone]

## 2020-04-03 NOTE — ANESTHESIA POSTPROCEDURE EVALUATION
Procedure(s):  APPENDECTOMY LAPAROSCOPIC (ESSENTIAL). general    Anesthesia Post Evaluation      Multimodal analgesia: multimodal analgesia not used between 6 hours prior to anesthesia start to PACU discharge  Patient location during evaluation: PACU  Patient participation: complete - patient participated  Level of consciousness: awake and alert  Pain score: 0  Pain management: adequate  Airway patency: patent  Anesthetic complications: no  Cardiovascular status: hemodynamically stable and acceptable  Respiratory status: acceptable  Hydration status: acceptable  Comments: Patient seen and evaluated; no concerns. Post anesthesia nausea and vomiting:  controlled      Vitals Value Taken Time   /61 4/3/2020  6:30 PM   Temp 36.9 °C (98.4 °F) 4/3/2020  6:24 PM   Pulse 66 4/3/2020  6:41 PM   Resp 18 4/3/2020  6:41 PM   SpO2 97 % 4/3/2020  6:41 PM   Vitals shown include unvalidated device data.

## 2021-09-08 ENCOUNTER — TRANSCRIBE ORDER (OUTPATIENT)
Dept: SCHEDULING | Age: 51
End: 2021-09-08

## 2021-09-09 ENCOUNTER — DOCUMENTATION ONLY (OUTPATIENT)
Dept: INTERNAL MEDICINE CLINIC | Age: 51
End: 2021-09-09

## 2021-09-09 ENCOUNTER — HOSPITAL ENCOUNTER (OUTPATIENT)
Dept: MAMMOGRAPHY | Age: 51
Discharge: HOME | End: 2021-09-09
Payer: COMMERCIAL

## 2021-09-09 ENCOUNTER — TELEPHONE (OUTPATIENT)
Dept: INTERNAL MEDICINE CLINIC | Age: 51
End: 2021-09-09

## 2021-09-09 ENCOUNTER — OFFICE VISIT (OUTPATIENT)
Dept: INTERNAL MEDICINE CLINIC | Age: 51
End: 2021-09-09
Payer: COMMERCIAL

## 2021-09-09 VITALS
BODY MASS INDEX: 28.85 KG/M2 | WEIGHT: 169 LBS | SYSTOLIC BLOOD PRESSURE: 118 MMHG | HEART RATE: 62 BPM | OXYGEN SATURATION: 98 % | RESPIRATION RATE: 14 BRPM | HEIGHT: 64 IN | TEMPERATURE: 98 F | DIASTOLIC BLOOD PRESSURE: 78 MMHG

## 2021-09-09 DIAGNOSIS — N63.0 BREAST LUMP: Primary | ICD-10-CM

## 2021-09-09 DIAGNOSIS — N63.20 LEFT BREAST MASS: ICD-10-CM

## 2021-09-09 DIAGNOSIS — N64.4 BREAST PAIN: ICD-10-CM

## 2021-09-09 DIAGNOSIS — G43.119 INTRACTABLE MIGRAINE WITH AURA WITHOUT STATUS MIGRAINOSUS: ICD-10-CM

## 2021-09-09 DIAGNOSIS — R92.8 ABNORMAL MAMMOGRAM OF RIGHT BREAST: ICD-10-CM

## 2021-09-09 DIAGNOSIS — N63.20 LEFT BREAST LUMP: ICD-10-CM

## 2021-09-09 DIAGNOSIS — N64.4 BREAST PAIN: Primary | ICD-10-CM

## 2021-09-09 DIAGNOSIS — K21.9 GASTROESOPHAGEAL REFLUX DISEASE WITHOUT ESOPHAGITIS: ICD-10-CM

## 2021-09-09 PROCEDURE — 99214 OFFICE O/P EST MOD 30 MIN: CPT | Performed by: INTERNAL MEDICINE

## 2021-09-09 PROCEDURE — 77066 DX MAMMO INCL CAD BI: CPT

## 2021-09-09 PROCEDURE — 76642 ULTRASOUND BREAST LIMITED: CPT

## 2021-09-09 RX ORDER — BUTALBITAL, ACETAMINOPHEN AND CAFFEINE 50; 325; 40 MG/1; MG/1; MG/1
1 TABLET ORAL
Qty: 30 TABLET | Refills: 0 | Status: SHIPPED | OUTPATIENT
Start: 2021-09-09 | End: 2022-09-29 | Stop reason: SDUPTHER

## 2021-09-09 RX ORDER — RIMEGEPANT SULFATE 75 MG/75MG
75 TABLET, ORALLY DISINTEGRATING ORAL
Qty: 5 TABLET | Refills: 0 | Status: SHIPPED | OUTPATIENT
Start: 2021-09-09 | End: 2021-09-09

## 2021-09-09 RX ORDER — PROMETHAZINE HYDROCHLORIDE 12.5 MG/1
12.5 TABLET ORAL
Qty: 30 TABLET | Refills: 0 | Status: SHIPPED | OUTPATIENT
Start: 2021-09-09 | End: 2022-09-29 | Stop reason: SDUPTHER

## 2021-09-09 RX ORDER — OMEPRAZOLE 40 MG/1
40 CAPSULE, DELAYED RELEASE ORAL DAILY
Qty: 90 CAPSULE | Refills: 0 | Status: SHIPPED | OUTPATIENT
Start: 2021-09-09 | End: 2021-12-01

## 2021-09-09 NOTE — PROGRESS NOTES
Diagnoses and all orders for this visit:    1. Breast pain  New problem, difficult exam with history of implants   Right breast implant ruptured and left breast implant may have ruptured as well. Bilateral saline implants placed 20 years ago  Getting mammograms through [facility]  Last 1/2017  No axillary lymphadenopathy or supraclavicular at lymphadenopathy  -     US BREAST LT COMPLETE 4 QUAD; Future  -     REFERRAL TO BREAST SURGERY  -     [facility] 3D EDNA W MAMMO BI DX INCL CAD; Future    2. Intractable migraine with aura without status migrainosus  Approximately 5 migraines per month  Attempted prophylaxis with: Amitriptyline, Topamax,  Triptan's reportedly make headaches worse  Offered neurology evaluation  We will try Nurtec side effects discussed  -     butalbital-acetaminophen-caffeine (FIORICET, ESGIC) -40 mg per tablet; Take 1 Tablet by mouth every six (6) hours as needed for Headache. -     promethazine (PHENERGAN) 12.5 mg tablet; Take 1 Tablet by mouth every six (6) hours as needed for Nausea. -     rimegepant (Nurtec ODT) 75 mg disintegrating tablet; Take 1 Tablet by mouth once as needed for Migraine for up to 1 dose. 3. Gastroesophageal reflux disease without esophagitis  Stable continue meds  -     omeprazole (PRILOSEC) 40 mg capsule; Take 1 Capsule by mouth daily. 4. Left breast mass  -     US BREAST LT COMPLETE 4 QUAD; Future  -     REFERRAL TO BREAST SURGERY  -     [facility] 3D EDNA W MAMMO BI DX INCL CAD; Future    Discussed with patient and will have breasts evaluated. Needs further evaluation for headaches and may need to see neurology.   She also needs an annual.      Chief Complaint   Patient presents with    Mass     on the left breast - she has implants - started five months ago pain started two weeks ago        Felt lump a few months ago   Drying off underneath  Implants 22 yo  Right capsule popped over 5 years ago  Pain whole breast    Migraines  Patient reports history of migraines. She has been on prophylactic medication. Amitriptyline and Topamax  amitrilpline worked but gained 30 pounds in 30 days  topamax se of Memory issues  For acute treatment she has tried triptan but it gives her headaches. She notes pain comes from the low neck and radiates up her neck into her left eye. She has tried muscle relaxants in the past as well. She denies sinus issues. Past Medical History:   Diagnosis Date    Anxiety     GERD (gastroesophageal reflux disease)     Headache     Nausea & vomiting      Past Surgical History:   Procedure Laterality Date    HX TOTAL ABDOMINAL HYSTERECTOMY  2007    fibroids and cysts    MO BREAST SURGERY PROCEDURE UNLISTED       Social History     Socioeconomic History    Marital status:      Spouse name: Not on file    Number of children: Not on file    Years of education: Not on file    Highest education level: Not on file   Tobacco Use    Smoking status: Former Smoker     Packs/day: 1.00     Years: 25.00     Pack years: 25.00     Quit date: 3/1/2019     Years since quittin.5    Smokeless tobacco: Never Used    Tobacco comment: 20 cigs/day about 15 years off and on   Vaping Use    Vaping Use: Never used   Substance and Sexual Activity    Alcohol use: Yes     Alcohol/week: 0.0 standard drinks     Comment: occasional    Drug use: No    Sexual activity: Yes     Partners: Male   Social History Narrative    Full time: sedentary     Manageable stress        3 kids raised by herself    22 yo daughter     25 yo twin men        2 children live at home and one of the twins got      Social Determinants of Health     Financial Resource Strain:     Difficulty of Paying Living Expenses:    Food Insecurity:     Worried About Running Out of Food in the Last Year:     [address] in the Last Year:    Transportation Needs:     Lack of Transportation (Medical):      Lack of Transportation (Non-Medical):    Physical Activity:     Days of Exercise per Week:     Minutes of Exercise per Session:    Stress:     Feeling of Stress :    Social Connections:     Frequency of Communication with Friends and Family:     Frequency of Social Gatherings with Friends and Family:     Attends [employer] Services:     Active Member of Clubs or Organizations:     Attends Club or Organization Meetings:     Marital Status:      Family History   Problem Relation Age of Onset    Thyroid Disease Mother     Cancer Father         lymphoma    Diabetes Father     Heart Disease Father     Hypertension Father     Diabetes Maternal Grandmother     Thyroid Disease Maternal Grandmother     Cancer Maternal Grandfather     Thyroid Disease Paternal Grandmother      Current Outpatient Medications   Medication Sig Dispense Refill    metroNIDAZOLE (Flagyl) 250 mg tablet Take  by mouth three (3) times daily.  ondansetron (ZOFRAN ODT) 4 mg disintegrating tablet Take 1 Tab by mouth every six (6) hours as needed for Nausea. Indications: prevent nausea and vomiting after surgery 12 Tab 1    butalbital-acetaminophen-caffeine (FIORICET, ESGIC) -40 mg per tablet Take 1 Tab by mouth every six (6) hours as needed for Headache. 12 Tab 0    promethazine (PHENERGAN) 12.5 mg tablet Take 1 Tab by mouth every six (6) hours as needed for Nausea. 10 Tab 0    omeprazole (PRILOSEC) 40 mg capsule Take 1 Cap by mouth daily. 90 Cap 0    fluticasone-salmeterol (ADVAIR) 250-50 mcg/dose diskus inhaler Take 1 Puff by inhalation two (2) times a day. MUST RINSE MOUTH AFTER USE 1 Each 0    fluticasone (FLONASE) 50 mcg/actuation nasal spray 2 sprays by Both Nostrils route daily. 1 Bottle 0    B.infantis-B.ani-B.long-B.bifi (Probiotic 4X) 10-15 mg TbEC Take  by mouth. (Patient not taking: Reported on 9/9/2021)      psyllium husk (Metamucil) 0.4 gram cap Take  by mouth.  (Patient not taking: Reported on 9/9/2021)      loperamide (IMODIUM) 2 mg capsule Take  by mouth. (Patient not taking: Reported on 9/9/2021)      ciprofloxacin HCl (CIPRO) 500 mg tablet Take 1 Tab by mouth two (2) times a day. (Patient not taking: Reported on 9/9/2021) 20 Tab 0    acetaminophen (Tylenol Extra Strength) 500 mg tablet Take 2 Tabs by mouth every eight (8) hours as needed for Pain. Indications: pain (Patient not taking: Reported on 9/9/2021) 60 Tab 1    ketorolac (TORADOL) 10 mg tablet Take 1 Tab by mouth every six (6) hours as needed for Pain. (Patient not taking: Reported on 9/9/2021) 12 Tab 0    methocarbamol (ROBAXIN) 500 mg tablet Take 1-2 tabs po at night only as needed for trapezius pain (Patient not taking: Reported on 9/9/2021) 30 Tab 0    montelukast (SINGULAIR) 10 mg tablet Take 1 Tab by mouth daily.  (Patient not taking: Reported on 9/9/2021) 30 Tab 4    VIVELLE-DOT 0.1 mg/24 hr  (Patient not taking: Reported on 9/9/2021)       Allergies   Allergen Reactions    Amitriptyline Other (comments)     Significant weight gain       Review of Systems - General ROS: negative for - chills or fever  Cardiovascular ROS: no chest pain or dyspnea on exertion  Respiratory ROS: no cough, shortness of breath, or wheezing    Visit Vitals  /78 (BP 1 Location: Left upper arm, BP Patient Position: Sitting, BP Cuff Size: Adult)   Pulse 62   Temp 98 °F (36.7 °C) (Oral)   Resp 14   Ht 5' 4\" (1.626 m)   Wt 169 lb (76.7 kg)   SpO2 98%   BMI 29.01 kg/m²     Constitutional: [x] Appears well-developed and well-nourished [x] No apparent distress      [] Abnormal -     Mental status: [x] Alert and awake  [x] Oriented to person/place/time [x] Able to follow commands    [] Abnormal -     Eyes:   EOM    [x]  Normal    [] Abnormal -   Sclera  [x]  Normal    [] Abnormal -          Discharge [x]  None visible   [] Abnormal -     HENT: [x] Normocephalic, atraumatic  [] Abnormal -   [x] Mouth/Throat: Mucous membranes are moist    External Ears [x] Normal  [] Abnormal -    Neck: [x] No visualized mass [] Abnormal -     Pulmonary/Chest: [x] Respiratory effort normal   [x] No visualized signs of difficulty breathing or respiratory distress        [] Abnormal -     Left breast: Firm mass/fullness at the right upper quadrant, mass under left breast laterally    Right breast  Saline implant palpated         Musculoskeletal:   [x] Normal gait with no signs of ataxia         [x] Normal range of motion of neck        [] Abnormal -     Neurological:        [x] No Facial Asymmetry (Cranial nerve 7 motor function) (limited exam due to video visit)          [x] No gaze palsy        [] Abnormal -          Skin:        [x] No significant exanthematous lesions or discoloration noted on facial skin         [] Abnormal -            Psychiatric:       [x] Normal Affect [] Abnormal -        [x] No Hallucinations      ATTENTION:   This medical record was transcribed using an electronic medical records/speech recognition system. Although proofread, it may and can contain electronic, spelling and other errors. Corrections may be executed at a later time. Please contact us for any clarifications as needed. On this date 09/09/21  I have spent 30 minutes reviewing previous notes, test results and face to face with the patient discussing the diagnosis and importance of compliance with the treatment plan as well as documenting on the day of the visit.

## 2021-09-09 NOTE — PROGRESS NOTES
PA FOR [facility] SENT TO COVER MY MEDS, Key: [id number]. WAITING FOR RESPONSE          PA Case: [id number], Status: Approved, Coverage Starts on: 9/9/2021 12:00:00 AM, Coverage Ends on: 12/8/2021 12:00:00 AM.      INFORMATION FAXED TO [facility].  [employer] MESSAGE WILL BE SENT TO PT.

## 2021-09-09 NOTE — TELEPHONE ENCOUNTER
Lala from [address] [place] [address] is calling to request a new order for patient. She needs bilateral breast ultrasound - we have sent one for the left only. Fax number: [phone] or place in [address] [facility]. Patient is currently there now.

## 2021-09-09 NOTE — TELEPHONE ENCOUNTER
I spoke with Ayanna Shade limited bilateral ordered.  Pt has left breat lump and right breast mammogram is abnormal.

## 2021-09-12 DIAGNOSIS — T85.43XD BREAST IMPLANT RUPTURE, SUBSEQUENT ENCOUNTER: Primary | ICD-10-CM

## 2021-09-24 ENCOUNTER — TELEPHONE (OUTPATIENT)
Dept: SURGERY | Age: 51
End: 2021-09-24

## 2021-09-24 ENCOUNTER — OFFICE VISIT (OUTPATIENT)
Dept: SURGERY | Age: 51
End: 2021-09-24
Payer: COMMERCIAL

## 2021-09-24 VITALS
WEIGHT: 169 LBS | BODY MASS INDEX: 28.85 KG/M2 | SYSTOLIC BLOOD PRESSURE: 128 MMHG | DIASTOLIC BLOOD PRESSURE: 61 MMHG | HEIGHT: 64 IN | HEART RATE: 68 BPM

## 2021-09-24 DIAGNOSIS — N64.4 BREAST PAIN: ICD-10-CM

## 2021-09-24 DIAGNOSIS — R92.8 ABNORMAL FINDING ON BREAST IMAGING: ICD-10-CM

## 2021-09-24 DIAGNOSIS — N60.11 FIBROCYSTIC BREAST CHANGES OF BOTH BREASTS: Primary | ICD-10-CM

## 2021-09-24 DIAGNOSIS — N60.12 FIBROCYSTIC BREAST CHANGES OF BOTH BREASTS: Primary | ICD-10-CM

## 2021-09-24 PROCEDURE — 99203 OFFICE O/P NEW LOW 30 MIN: CPT | Performed by: NURSE PRACTITIONER

## 2021-09-24 NOTE — PROGRESS NOTES
HISTORY OF PRESENT ILLNESS  Niki Jama is a 52 y.o. female. HPI New patient presents for evaluation of breast pain. Reports BILATERAL breast pain, but LEFT more than RIGHT. UOQ of LEFT breast is most uncomfortable area is and implant is hard and doesn't move. RIGHT UOQ tender as well. Breast history -   Referring - Dr. Berrios [facility]  History of bilateral breast implants - approx       Family history -   Maternal cousin - breast cancer at 28  Maternal aunt - breast cancer in her 76s      OB History        3    Para   3    Term   3            AB        Living           SAB        TAB        Ectopic        Molar        Multiple        Live Births   3          Obstetric Comments   Menarche 17, LMP ?, # of children 1, age of 4st delivery 25, Hysterectomy/oophorectomy yes/yes, Breast bx no, history of breast feeding yes, BCP yes, Hormone therapy ? Past Surgical History:   Procedure Laterality Date    HX TOTAL ABDOMINAL HYSTERECTOMY      fibroids and cysts    IMPLANT BREAST SILICONE/EQ      [place] BREAST SURGERY PROCEDURE UNLISTED         MAURO Results (most recent):  Results from Hospital Encounter encounter on 21    [facility] 3D EDNA W MAMMO BI DX INCL CAD    Narrative  DIGITAL BILATERAL TOMOSYNTHESIS MAMMOGRAM  AND BREAST ULTRASOUND:    INDICATION / HISTORY:  Left palpable abnormality. No family history breast  cancer. Moctezuma Quiet v3.01 lifetime breast cancer risk  6.72%. [place] Quiet 5 year breast cancer risk  0.65%. COMPARISON:No comparisons are available at this time. They are being requested. TECHNIQUE:  Computer Aided Detection (CAD) was used in evaluating this  mammogram.    MAMMOGRAM FINDINGS: Bilateral MLO and CC C-View and tomosynthesis imaging  performed in the implant displaced position, standard imaging performed in the  implant position. Bilateral saline subglandular implants, the right is heavily  calcified compared to the left.  Scattered fibroglandular tissue. Triangular  palpable marker is posterior, overlying the calcified implant. 1.3 cm oval mass  right upper outer breast. 0.6 centimeter mass right upper breast. No suspicious  calcification, architectural distortion. No skin thickening or evident axillary  adenopathy. ULTRASOUND FINDINGS: Targeted bilateral breast ultrasound performed. Ultrasound imaging over the left 5:00 position 5-6 cm from nipple, area of  palpable abnormality demonstrates the calcified saline implant. A 6 mm  circumscribed hypoechoic structure in the general region cannot be duplicated,  and only a focal fat lobule can be identified in this location. Right 10:00-11:00 position 10 cm from nipple: 1.0 x 0.5 x 1.2 cm hypoechoic mass  correlates to the larger right mammographic mass. Probably benign. Right 10:00 to 11:00 position closer to the nipple demonstrates multiple small  cysts, the largest is 0.6 cm correlating to the mammographic mass. Benign. Impression  1. Left palpable correlates to calcified implant. Recommend clinical management. 2. Right 1.3 cm probably benign mass. Recommend 6 month follow-up right breast  ultrasound unless prior images become available to document stability. 3. Right 0.6 cm mammographic mass correlates to cyst. Benign. RESULT CODE: ACR BI-RADS category 3, probably benign, short interval follow-up  suggested. FOLLOWUP CODE: 6 month follow-up right breast ultrasound. Mrs Saloni Anderson was given verbal result at the time of the study. Written result  letter to follow, in the mail. According to the [address], yearly mammograms are recommended  starting at age 36 and continuing as long as a woman is in good health. Clinical Breast Exams should be a part of a periodic health exam - about every  three years for women in their 25s and 35s and every year for women 40 and over. Breast self exam is an option for women starting in their 25s.   Any breast  change noted on a breast self exam should be reported promptly to the patient's  healthcare provider. Breast MRI is recommended for women with an approximately  20-25% or greater lifetime risk of breast cancer, including women with a strong  family history of breast or ovarian cancer and women who have been treated for  Hodgkin's disease. A negative Mammography report should not discourage follow up or biopsy of a  clinically significant finding and/or abnormality. Dense breast tissue may obscure small neoplasms. ROS    Physical Exam  Constitutional:       Appearance: Normal appearance. Chest:      Breasts:         Right: Tenderness present. No mass, nipple discharge or skin change. Left: Tenderness present. No mass, nipple discharge or skin change. Musculoskeletal:      Comments: FROM - UE x 2     Lymphadenopathy:      Upper Body:      Right upper body: No supraclavicular or axillary adenopathy. Left upper body: No supraclavicular or axillary adenopathy. Neurological:      Mental Status: She is alert. Psychiatric:         Attention and Perception: Attention normal.         Mood and Affect: Mood normal.         Speech: Speech normal.         Behavior: Behavior normal.       Visit Vitals  /61 (BP 1 Location: Left upper arm, BP Patient Position: Sitting, BP Cuff Size: Adult)   Pulse 68   Ht 5' 4\" (1.626 m)   Wt 169 lb (76.7 kg)   BMI 29.01 kg/m²       ASSESSMENT and PLAN  Encounter Diagnoses   Name Primary?  Fibrocystic breast changes of both breasts Yes    Breast pain        Normal exam with no evidence of malignancy. RIGHT breast UOQ - no discrete mass palpated although area is fibrocystic and tender. Mass seen on imaging for 6 month follow-up. Refer to Dr. Shantell Lopez - would like to discuss implant replacement or removal with a breast lift. RIGHT US in 3/2022 - 6 month follow-up for RIGHT breast mass. Would like to have annual CBE here. RTC in 1 year or sooner PRN.  She is comfortable with this plan. All questions answered and she stated understanding. Total time spent for this patient - 30 minutes.

## 2021-09-24 NOTE — TELEPHONE ENCOUNTER
Refer to plastic surgery - Muriel  Discuss removal/replacement of implants. Would like to be seen ASAP to have surgery before the end of the year.

## 2021-09-24 NOTE — PATIENT INSTRUCTIONS

## 2021-10-05 ENCOUNTER — TELEPHONE (OUTPATIENT)
Dept: SURGERY | Age: 51
End: 2021-10-05

## 2021-10-05 DIAGNOSIS — N64.4 BREAST PAIN: ICD-10-CM

## 2021-10-05 DIAGNOSIS — N60.11 FIBROCYSTIC BREAST CHANGES OF BOTH BREASTS: Primary | ICD-10-CM

## 2021-10-05 DIAGNOSIS — N60.12 FIBROCYSTIC BREAST CHANGES OF BOTH BREASTS: Primary | ICD-10-CM

## 2021-10-05 NOTE — TELEPHONE ENCOUNTER
Called and spoke to patient. Saw Dr. Carole Bonilla in Dr. Victor M Velasco office. Concerned about insurance coverage for her surgery. Her insurance company has stated they will cover surgery for capsular contracture, but  at plastic surgery office is saying it is not covered as it is cosmetic even though the surgeon said there was capsular contracture. She will follow-up with manager and I will send additional names of plastic surgeons to the patient.        ----- Message from Chayo Avina RN sent at 10/5/2021 10:23 AM EDT -----  Regarding: FW: Referral Request  Contact: [phone]    ----- Message -----  From: Liane Cushing  Sent: 10/5/2021  10:17 AM EDT  To: [facility]  Subject: Referral Request                                 I was recently seen by Inez Jarvis for breast pain. She referred me to Dr. Kassy Joseph for evaluation and recommendations concerning my breast implants. I would like to speak with her as soon as she has a moment in reference to this appointment. I have a few questions.     I can be reached [phone]

## 2021-11-10 ENCOUNTER — TELEPHONE (OUTPATIENT)
Dept: INTERNAL MEDICINE CLINIC | Age: 51
End: 2021-11-10

## 2021-11-10 NOTE — TELEPHONE ENCOUNTER
This request has received a Favorable outcome. Please note any additional information provided by [employer] at the bottom of this request.    Attempted PA for Nurtec 75mg tablets.

## 2021-12-01 DIAGNOSIS — K21.9 GASTROESOPHAGEAL REFLUX DISEASE WITHOUT ESOPHAGITIS: ICD-10-CM

## 2021-12-01 RX ORDER — OMEPRAZOLE 40 MG/1
CAPSULE, DELAYED RELEASE ORAL
Qty: 90 CAPSULE | Refills: 0 | Status: SHIPPED | OUTPATIENT
Start: 2021-12-01 | End: 2022-08-03 | Stop reason: SDUPTHER

## 2022-08-03 DIAGNOSIS — K21.9 GASTROESOPHAGEAL REFLUX DISEASE WITHOUT ESOPHAGITIS: ICD-10-CM

## 2022-08-03 RX ORDER — RIMEGEPANT SULFATE 75 MG/75MG
TABLET, ORALLY DISINTEGRATING ORAL
Qty: 12 TABLET | Refills: 0 | Status: SHIPPED | OUTPATIENT
Start: 2022-08-03 | End: 2022-09-29 | Stop reason: SDUPTHER

## 2022-08-03 RX ORDER — OMEPRAZOLE 40 MG/1
40 CAPSULE, DELAYED RELEASE ORAL DAILY
Qty: 30 CAPSULE | Refills: 0 | Status: SHIPPED | OUTPATIENT
Start: 2022-08-03

## 2022-08-10 RX ORDER — FLUTICASONE PROPIONATE 50 MCG
2 SPRAY, SUSPENSION (ML) NASAL DAILY
Qty: 1 EACH | Refills: 1 | Status: SHIPPED | OUTPATIENT
Start: 2022-08-10

## 2022-08-18 ENCOUNTER — DOCUMENTATION ONLY (OUTPATIENT)
Dept: INTERNAL MEDICINE CLINIC | Age: 52
End: 2022-08-18

## 2022-08-18 ENCOUNTER — TELEPHONE (OUTPATIENT)
Dept: INTERNAL MEDICINE CLINIC | Age: 52
End: 2022-08-18

## 2022-08-18 NOTE — TELEPHONE ENCOUNTER
Nurtec prior authorization program representative called to check the status of the patient's prior authorization. She is calling to see if one has been submitted and has it been approved or denied. Please call back and advise at [phone] ext [phone]. This is a secure line so a voicemail can be left. [facility] representative also stated you can fax this information if you would like to [phone].

## 2022-08-25 ENCOUNTER — TELEPHONE (OUTPATIENT)
Dept: INTERNAL MEDICINE CLINIC | Age: 52
End: 2022-08-25

## 2022-08-25 NOTE — TELEPHONE ENCOUNTER
Anaya Osman from [email or web address] is calling to state a prior authorization is needed for the patient's Nurtec medication. Please call back at [phone] and use reference key [id number].

## 2022-09-27 ENCOUNTER — TRANSCRIBE ORDER (OUTPATIENT)
Dept: MAMMOGRAPHY | Age: 52
End: 2022-09-27

## 2022-09-27 DIAGNOSIS — R92.8 FOLLOW-UP EXAMINATION OF ABNORMAL MAMMOGRAM: Primary | ICD-10-CM

## 2022-09-27 DIAGNOSIS — R93.89 ABNORMAL ULTRASOUND: Primary | ICD-10-CM

## 2022-09-29 ENCOUNTER — OFFICE VISIT (OUTPATIENT)
Dept: INTERNAL MEDICINE CLINIC | Age: 52
End: 2022-09-29
Payer: COMMERCIAL

## 2022-09-29 VITALS
HEART RATE: 83 BPM | RESPIRATION RATE: 14 BRPM | DIASTOLIC BLOOD PRESSURE: 81 MMHG | SYSTOLIC BLOOD PRESSURE: 129 MMHG | WEIGHT: 180 LBS | BODY MASS INDEX: 30.73 KG/M2 | HEIGHT: 64 IN | OXYGEN SATURATION: 95 % | TEMPERATURE: 98 F

## 2022-09-29 DIAGNOSIS — R10.13 EPIGASTRIC PAIN: ICD-10-CM

## 2022-09-29 DIAGNOSIS — F41.9 ANXIETY: ICD-10-CM

## 2022-09-29 DIAGNOSIS — R53.83 FATIGUE, UNSPECIFIED TYPE: ICD-10-CM

## 2022-09-29 DIAGNOSIS — G43.119 INTRACTABLE MIGRAINE WITH AURA WITHOUT STATUS MIGRAINOSUS: ICD-10-CM

## 2022-09-29 DIAGNOSIS — Z11.59 ENCOUNTER FOR HEPATITIS C SCREENING TEST FOR LOW RISK PATIENT: ICD-10-CM

## 2022-09-29 DIAGNOSIS — F32.1 CURRENT MODERATE EPISODE OF MAJOR DEPRESSIVE DISORDER WITHOUT PRIOR EPISODE (HCC): Primary | ICD-10-CM

## 2022-09-29 DIAGNOSIS — E78.00 ELEVATED LDL CHOLESTEROL LEVEL: ICD-10-CM

## 2022-09-29 PROCEDURE — 99215 OFFICE O/P EST HI 40 MIN: CPT | Performed by: INTERNAL MEDICINE

## 2022-09-29 RX ORDER — RIMEGEPANT SULFATE 75 MG/75MG
TABLET, ORALLY DISINTEGRATING ORAL
Qty: 12 TABLET | Refills: 0 | Status: SHIPPED | OUTPATIENT
Start: 2022-09-29

## 2022-09-29 RX ORDER — BUTALBITAL, ACETAMINOPHEN AND CAFFEINE 50; 325; 40 MG/1; MG/1; MG/1
1 TABLET ORAL
Qty: 30 TABLET | Refills: 0 | Status: SHIPPED | OUTPATIENT
Start: 2022-09-29

## 2022-09-29 RX ORDER — BUSPIRONE HYDROCHLORIDE 5 MG/1
TABLET ORAL
Qty: 45 TABLET | Refills: 0 | Status: SHIPPED | OUTPATIENT
Start: 2022-09-29 | End: 2022-10-14 | Stop reason: SDUPTHER

## 2022-09-29 RX ORDER — PROMETHAZINE HYDROCHLORIDE 12.5 MG/1
12.5 TABLET ORAL
Qty: 30 TABLET | Refills: 0 | Status: SHIPPED | OUTPATIENT
Start: 2022-09-29

## 2022-09-29 NOTE — PROGRESS NOTES
Marisela Padron is a 54 y.o. female and presents with Complete Physical and Labs (fasting)  . Subjective:  Patient presents initially for her annual but had additional medical issues which needed to be addressed. She needs her well check form for her job. She is a single mom and raised 3 children. She is currently working full time and exercising. Since her last visit she had a cholecystectomy. Headaches  Patient reports that she is still having persistent headaches. She notes that her current headache has been going on for about 4 days. She has tried multiple medications for prophylaxis however had side effects to them. Amitriptyline rapid weight gain 30 days  Topamax memory issues  Midrin spacy  Triptin headache worse  She finds that Nurtec works however it is very expensive. When she has a bad migraine she takes Nurtec and in the evening if she is still having headache she takes Fioricet and Phenergan as she does not want to use her extensive Nurtec  She has never seen a neurologist.    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 She has a lot of responsibilities within the company. Anxiety  She reports today that she has been having a lot of anxiety. She will start panicking. She does not drink alcohol. She drinks about 3-4 drinks per month. She has been on Zoloft in the past however had significant weight gain and does not want this again. Depression  PHQ-9 is 18 consistent with moderate depression  She notes that sometimes she has periods where she will cry but without knowing reason why  She is a very good relationship with her mother.   Situational stressors chronic  Sleeping 7 hours but interupted and not well rested  Energy level 5/10    Review of Systems  Constitutional: negative for fevers, chills, anorexia and weight loss  Eyes:   negative for visual disturbance and irritation  ENT:   negative for tinnitus,sore throat,nasal congestion,ear pains. hoarseness  Respiratory:  negative for cough, hemoptysis, dyspnea,wheezing  CV:   negative for chest pain, palpitations, lower extremity edema  GI:   negative for nausea, vomiting, diarrhea, abdominal pain,melena  Endo:               negative for polyuria,polydipsia,polyphagia,heat intolerance  Genitourinary: negative for frequency, dysuria and hematuria  Integument:  negative for rash and pruritus  Hematologic:  negative for easy bruising and gum/nose bleeding  Musculoskel: negative for myalgias, arthralgias, back pain, muscle weakness, joint pain  Neurological:  negative for headaches, dizziness, vertigo, memory problems and gait   Behavl/Psych: negative for feelings of anxiety, depression, mood changes    Past Medical History:   Diagnosis Date    Anxiety     GERD (gastroesophageal reflux disease)     Headache     Nausea & vomiting      Past Surgical History:   Procedure Laterality Date    HX TOTAL ABDOMINAL HYSTERECTOMY  2007    fibroids and cysts    IMPLANT BREAST SILICONE/EQ      [place] BREAST SURGERY PROCEDURE UNLISTED       Social History     Socioeconomic History    Marital status: SINGLE   Tobacco Use    Smoking status: Former     Packs/day: 1.00     Years: 25.00     Pack years: 25.00     Types: Cigarettes     Quit date: 3/1/2019     Years since quitting: 3.5    Smokeless tobacco: Never    Tobacco comments:     20 cigs/day about 15 years off and on   Vaping Use    Vaping Use: Never used   Substance and Sexual Activity    Alcohol use:  Yes     Alcohol/week: 0.0 standard drinks     Comment: occasional    Drug use: No    Sexual activity: Yes     Partners: Male   Social History Narrative    Full time: sedentary     Manageable stress        3 kids raised by herself 20 yo daughter     23 yo twin men        2 children live at home and one of the twins got      Family History   Problem Relation Age of Onset    Thyroid Disease Mother     Cancer Father         lymphoma    Diabetes Father     Heart Disease Father     Hypertension Father     Prostate Cancer Father     Diabetes Maternal Grandmother     Thyroid Disease Maternal Grandmother     Cancer Maternal Grandfather     Thyroid Disease Paternal Grandmother     Breast Cancer Maternal Aunt     Breast Cancer Cousin      Current Outpatient Medications   Medication Sig Dispense Refill    fluticasone propionate (FLONASE) 50 mcg/actuation nasal spray Take 2 Sprays by Both Nostrils route in the morning. 1 Each 1    Nurtec ODT 75 mg disintegrating tablet TAKE 1 TABLET BY MOUTH ONCE AS NEEDED FOR MIGRAINE FOR UP TO 1 DOSE. 12 Tablet 0    omeprazole (PRILOSEC) 40 mg capsule Take 1 Capsule by mouth in the morning. 30 Capsule 0    butalbital-acetaminophen-caffeine (FIORICET, ESGIC) -40 mg per tablet Take 1 Tablet by mouth every six (6) hours as needed for Headache. 30 Tablet 0    promethazine (PHENERGAN) 12.5 mg tablet Take 1 Tablet by mouth every six (6) hours as needed for Nausea. 30 Tablet 0    ondansetron (ZOFRAN ODT) 4 mg disintegrating tablet Take 1 Tab by mouth every six (6) hours as needed for Nausea. Indications: prevent nausea and vomiting after surgery (Patient not taking: Reported on 9/29/2022) 12 Tab 1    montelukast (SINGULAIR) 10 mg tablet Take 1 Tab by mouth daily.  (Patient not taking: Reported on 9/29/2022) 30 Tab 4    VIVELLE-DOT 0.1 mg/24 hr  (Patient not taking: Reported on 9/29/2022)       Allergies   Allergen Reactions    Amitriptyline Other (comments)     Significant weight gain       Objective:  Visit Vitals  /81 (BP 1 Location: Left upper arm, BP Patient Position: Sitting, BP Cuff Size: Small adult)   Pulse 83   Temp 98 °F (36.7 °C) (Oral)   Resp 14   Ht 5' 4\" (1.626 m)   Wt 180 lb (81.6 kg)   SpO2 95%   BMI 30.90 kg/m²     Physical Exam:   General appearance - alert, well appearing, and in no distress  Mental status - alert, oriented to person, place, and time  EYE-BENJAMIN, EOMI, fundi normal, corneas normal, no foreign bodies, visual acuity normal both eyes, no periorbital cellulitis  ENT-ENT exam normal, no neck nodes or sinus tenderness  Nose - normal and patent, no erythema, discharge or polyps  Mouth - mucous membranes moist, pharynx normal without lesions  Neck - supple, no significant adenopathy   Chest - clear to auscultation, no wheezes, rales or rhonchi, symmetric air entry   Heart - normal rate, regular rhythm, normal S1, S2, no murmurs, rubs, clicks or gallops   Abdomen - soft, nontender, nondistended, no masses or organomegaly  Lymph- no adenopathy palpable  Ext-peripheral pulses normal, no pedal edema, no clubbing or cyanosis  Skin-Warm and dry.  no hyperpigmentation, vitiligo, or suspicious lesions  Neuro -alert, oriented, normal speech, no focal findings or movement disorder noted  Neck-normal C-spine, no tenderness, full ROM without pain  Gyn not indicated as pt with MARY bso      Results for orders placed or performed in visit on 02/15/17   LIPID PANEL   Result Value Ref Range    Cholesterol, total 215 (H) 100 - 199 mg/dL    Triglyceride 124 0 - 149 mg/dL    HDL Cholesterol 60 >39 mg/dL    VLDL, calculated 25 5 - 40 mg/dL    LDL, calculated 130 (H) 0 - 99 mg/dL   TSH 3RD GENERATION   Result Value Ref Range    TSH 1.330 0.450 - 4.500 uIU/mL   CBC W/O DIFF   Result Value Ref Range    WBC 7.5 3.4 - 10.8 x10E3/uL    RBC 5.41 (H) 3.77 - 5.28 x10E6/uL    HGB 16.6 (H) 11.1 - 15.9 g/dL    HCT 48.7 (H) 34.0 - 46.6 %    MCV 90 79 - 97 fL    MCH 30.7 26.6 - 33.0 pg    MCHC 34.1 31.5 - 35.7 g/dL    RDW 14.4 12.3 - 15.4 %    PLATELET 165 275 - 225 x10E3/uL   CVD REPORT   Result Value Ref Range    INTERPRETATION Note      Prevention    Cardiovascular profile  Family hx  Exercising:  Forrest riding [employer]venotrito  Blood pressure:  Health healthy diet:  Diabetes:  Cholesterol:  Renal function:      Cancer risk profile  Mammogram 6/14, [facility] good report due 7/15 cig smoking  Lung no sx  Colonoscopy no blood stool  Skin nonhealing in 2 weeks no sx  Gyn abnormal bleeding/discharge/abd pain/pressure no sx, sees gyn      Thyroid sx  + anxiety situatioanally related ot son    Osteopenia prevention  Calcium 1000mg/day yes  Vitamin D 800iu/day yes    Mental health scale: 7-8/10  Depression  Anxiety  Sleep # of hours:  Energy Level:        Immunizations  TDAP defer  Pneumonia vaccine  Flu vaccine  Shingles vaccine  HPV        Diagnoses and all orders for this visit:    Patient initially scheduled for her physical however was having symptoms of depression/anxiety and uncontrolled headaches. Addressed these medical issues and will need to have a follow-up physical.  She needs also close follow-up with regards to her depression anxiety and headache symptoms      1. Current moderate episode of major depressive disorder without prior episode [facility])  Not controlled  Her PHQ 9 score is 18. She defers SSRI for now and notes that her symptoms are not as severe  Discussed initiating Wellbutrin which is more weight neutral.  We can also add naltrexone with this if needed for some weight loss. No SI or HI  She notes that her anxiety symptoms are more significant. Her weight is also aggravating her mental health    She needs close follow-up in 2 weeks for follow-up of depression and initiate Wellbutrin. She may also benefit from Trintellix if we can get coverage    2.  Intractable migraine with aura without status migrainosus  Not controlled  Refill meds  After discussion interested in seeing neurology for med management/treatment  -     Nurtec ODT 75 mg disintegrating tablet; TAKE 1 TABLET BY MOUTH ONCE AS NEEDED FOR MIGRAINE FOR UP TO 1 DOSE.  -     REFERRAL TO NEUROLOGY  -     butalbital-acetaminophen-caffeine (FIORICET, ESGIC) -40 mg per tablet; Take 1 Tablet by mouth every six (6) hours as needed for Headache. -     promethazine (PHENERGAN) 12.5 mg tablet; Take 1 Tablet by mouth every six (6) hours as needed for Nausea. -     METABOLIC PANEL, COMPREHENSIVE; Future    3. Epigastric pain  She is due for colonoscopy  With epigastric pain may need endoscopy as well  -     REFERRAL TO GASTROENTEROLOGY    4. Encounter for hepatitis C screening test for low risk patient  -     HEPATITIS C AB; Future    5. Elevated LDL cholesterol level  History of not being controlled  Discussed results at next visit  Reassess smoking history-     LIPID PANEL; Future  -     METABOLIC PANEL, COMPREHENSIVE; Future    6. Fatigue, unspecified type  Multifactorial from possibly anemia but untreated depression anxiety-     CBC W/O DIFF; Future  -     TSH 3RD GENERATION; Future    7. Anxiety  Patient would like medication to help her to sleep and to deal with her anxiety. Needs close follow-up-     busPIRone (BUSPAR) 5 mg tablet; Take 1/2 tab po at night 1 hour prior to sleep may increase to 1 tablet if needed can take bid. Patient needs reassessment in 2 weeks. She will also eventually need to have a physical.    This medical record was transcribed using an electronic medical records/speech recognition system. Although proofread, it may and can contain electronic, spelling and other errors. Corrections may be executed at a later time. Please contact us for any clarifications as needed. On this date 09/29/22  I have spent 40 minutes reviewing previous notes, test results and face to face with the patient discussing the diagnosis and importance of compliance with the treatment plan as well as documenting on the day of the visit. Patient presented with symptoms of depression and anxiety which needed screening and treatment as well as headache evaluation.   She will need close follow-up  Please note her prevention was addressed above

## 2022-09-30 LAB
ALBUMIN SERPL-MCNC: 4.3 G/DL (ref 3.5–5)
ALBUMIN/GLOB SERPL: 1.4 {RATIO} (ref 1.1–2.2)
ALP SERPL-CCNC: 112 U/L (ref 45–117)
ALT SERPL-CCNC: 37 U/L (ref 12–78)
ANION GAP SERPL CALC-SCNC: 5 MMOL/L (ref 5–15)
AST SERPL-CCNC: 13 U/L (ref 15–37)
BILIRUB SERPL-MCNC: 0.4 MG/DL (ref 0.2–1)
BUN SERPL-MCNC: 22 MG/DL (ref 6–20)
BUN/CREAT SERPL: 26 (ref 12–20)
CALCIUM SERPL-MCNC: 9.9 MG/DL (ref 8.5–10.1)
CHLORIDE SERPL-SCNC: 104 MMOL/L (ref 97–108)
CHOLEST SERPL-MCNC: 271 MG/DL
CO2 SERPL-SCNC: 29 MMOL/L (ref 21–32)
CREAT SERPL-MCNC: 0.85 MG/DL (ref 0.55–1.02)
ERYTHROCYTE [DISTWIDTH] IN BLOOD BY AUTOMATED COUNT: 13.2 % (ref 11.5–14.5)
GLOBULIN SER CALC-MCNC: 3.1 G/DL (ref 2–4)
GLUCOSE SERPL-MCNC: 99 MG/DL (ref 65–100)
HCT VFR BLD AUTO: 46.2 % (ref 35–47)
HCV AB SERPL QL IA: NONREACTIVE
HDLC SERPL-MCNC: 65 MG/DL
HDLC SERPL: 4.2 {RATIO} (ref 0–5)
HGB BLD-MCNC: 15 G/DL (ref 11.5–16)
LDLC SERPL CALC-MCNC: 164.4 MG/DL (ref 0–100)
MCH RBC QN AUTO: 30.1 PG (ref 26–34)
MCHC RBC AUTO-ENTMCNC: 32.5 G/DL (ref 30–36.5)
MCV RBC AUTO: 92.8 FL (ref 80–99)
NRBC # BLD: 0 K/UL (ref 0–0.01)
NRBC BLD-RTO: 0 PER 100 WBC
PLATELET # BLD AUTO: 273 K/UL (ref 150–400)
PMV BLD AUTO: 9.1 FL (ref 8.9–12.9)
POTASSIUM SERPL-SCNC: 4.3 MMOL/L (ref 3.5–5.1)
PROT SERPL-MCNC: 7.4 G/DL (ref 6.4–8.2)
RBC # BLD AUTO: 4.98 M/UL (ref 3.8–5.2)
SODIUM SERPL-SCNC: 138 MMOL/L (ref 136–145)
TRIGL SERPL-MCNC: 208 MG/DL (ref ?–150)
TSH SERPL DL<=0.05 MIU/L-ACNC: 1.9 UIU/ML (ref 0.36–3.74)
VLDLC SERPL CALC-MCNC: 41.6 MG/DL
WBC # BLD AUTO: 6.2 K/UL (ref 3.6–11)

## 2022-09-30 NOTE — PROGRESS NOTES
Please reach out to patient and have her follow-up in 2 to 3 weeks. We need to discuss her medications and also cholesterol. Thank you.

## 2022-10-10 ENCOUNTER — DOCUMENTATION ONLY (OUTPATIENT)
Dept: INTERNAL MEDICINE CLINIC | Age: 52
End: 2022-10-10

## 2022-10-10 NOTE — PROGRESS NOTES
Nurtec has been approved Key: [id number] information sent to the pharmacy.  Affresol message sent to pt

## 2022-10-14 ENCOUNTER — OFFICE VISIT (OUTPATIENT)
Dept: INTERNAL MEDICINE CLINIC | Age: 52
End: 2022-10-14

## 2022-10-14 VITALS
WEIGHT: 182 LBS | BODY MASS INDEX: 31.07 KG/M2 | RESPIRATION RATE: 16 BRPM | SYSTOLIC BLOOD PRESSURE: 117 MMHG | HEART RATE: 81 BPM | HEIGHT: 64 IN | TEMPERATURE: 98.2 F | DIASTOLIC BLOOD PRESSURE: 68 MMHG | OXYGEN SATURATION: 93 %

## 2022-10-14 DIAGNOSIS — E78.2 MIXED HYPERLIPIDEMIA: ICD-10-CM

## 2022-10-14 DIAGNOSIS — M77.8 RIGHT ELBOW TENDINITIS: ICD-10-CM

## 2022-10-14 DIAGNOSIS — F41.9 ANXIETY: ICD-10-CM

## 2022-10-14 DIAGNOSIS — F32.A DEPRESSION, UNSPECIFIED DEPRESSION TYPE: Primary | ICD-10-CM

## 2022-10-14 PROCEDURE — 99214 OFFICE O/P EST MOD 30 MIN: CPT | Performed by: INTERNAL MEDICINE

## 2022-10-14 RX ORDER — BUSPIRONE HYDROCHLORIDE 5 MG/1
2.5 TABLET ORAL
Qty: 135 TABLET | Refills: 1 | Status: SHIPPED | OUTPATIENT
Start: 2022-10-14

## 2022-10-14 RX ORDER — BUPROPION HYDROCHLORIDE 150 MG/1
150 TABLET, EXTENDED RELEASE ORAL DAILY
Qty: 30 TABLET | Refills: 0 | Status: SHIPPED | OUTPATIENT
Start: 2022-10-14 | End: 2022-11-02

## 2022-10-14 RX ORDER — CLONAZEPAM 0.5 MG/1
0.5 TABLET ORAL
Qty: 5 TABLET | Refills: 0 | Status: SHIPPED | OUTPATIENT
Start: 2022-10-14

## 2022-10-14 RX ORDER — DICLOFENAC SODIUM 50 MG/1
50 TABLET, DELAYED RELEASE ORAL 2 TIMES DAILY
Qty: 21 TABLET | Refills: 0 | Status: SHIPPED | OUTPATIENT
Start: 2022-10-14 | End: 2022-11-02 | Stop reason: SDUPTHER

## 2022-11-02 ENCOUNTER — OFFICE VISIT (OUTPATIENT)
Dept: INTERNAL MEDICINE CLINIC | Age: 52
End: 2022-11-02
Payer: COMMERCIAL

## 2022-11-02 ENCOUNTER — DOCUMENTATION ONLY (OUTPATIENT)
Dept: INTERNAL MEDICINE CLINIC | Age: 52
End: 2022-11-02

## 2022-11-02 VITALS
OXYGEN SATURATION: 98 % | WEIGHT: 185.5 LBS | HEIGHT: 64 IN | TEMPERATURE: 98.2 F | HEART RATE: 83 BPM | RESPIRATION RATE: 16 BRPM | BODY MASS INDEX: 31.67 KG/M2 | DIASTOLIC BLOOD PRESSURE: 82 MMHG | SYSTOLIC BLOOD PRESSURE: 118 MMHG

## 2022-11-02 DIAGNOSIS — F32.A DEPRESSION, UNSPECIFIED DEPRESSION TYPE: ICD-10-CM

## 2022-11-02 DIAGNOSIS — M54.50 RIGHT-SIDED LOW BACK PAIN WITHOUT SCIATICA, UNSPECIFIED CHRONICITY: ICD-10-CM

## 2022-11-02 PROCEDURE — 99214 OFFICE O/P EST MOD 30 MIN: CPT | Performed by: INTERNAL MEDICINE

## 2022-11-02 RX ORDER — BUPROPION HYDROCHLORIDE 150 MG/1
150 TABLET, EXTENDED RELEASE ORAL DAILY
Qty: 90 TABLET | Refills: 1 | Status: SHIPPED | OUTPATIENT
Start: 2022-11-02

## 2022-11-02 RX ORDER — DICLOFENAC SODIUM 50 MG/1
50 TABLET, DELAYED RELEASE ORAL 2 TIMES DAILY
Qty: 21 TABLET | Refills: 0 | Status: SHIPPED | OUTPATIENT
Start: 2022-11-02

## 2022-11-02 NOTE — PROGRESS NOTES
Diagnoses and all orders for this visit:    1. BMI (body mass index) pediatric, > 99% for age, obese child, tertiary care intervention  After discussion able to take injection/work peers also taking Ozempic  Will pursue prior authorization for Ozempic  Discussed heart healthy eating a lower carb diet    Other orders  -     semaglutide (OZEMPIC) 0.25 mg or 0.5 mg/dose (2 mg/1.5 ml) subq pen; 0.25 mg by SubCUTAneous route every seven (7) days. 2. Depression, unspecified depression type  Improved  No side effects  BuSpar also offsetting anxiety-     buPROPion SR (WELLBUTRIN SR) 150 mg SR tablet; Take 1 Tablet by mouth daily. 3. Right-sided low back pain without sciatica, unspecified chronicity  -     diclofenac EC (VOLTAREN) 50 mg EC tablet; Take 1 Tablet by mouth two (2) times a day. Take with food and water for about 1-2 weeks           Jose Luis Albrecht is a 50 y.o. female and presents with Medication Evaluation  . Anxiety  Patient reports that the BuSpar 2.5 mg twice daily has been helpful for her. She has not required clonazepam since last visit. She has not had any panic attacks and no symptoms of feeling hot and sweaty as before    Background  She is a single mom and raised 3 children. She is currently working full time and exercising. Since her last visit she had a cholecystectomy. Counselor  Does not need right now  Sertraline caused weight gain in the past      Headaches  She is taking the Nurtec as needed. Her headaches frequency have decreased significantly and 1 or 2 since the last visit    During the daytime she takes Nurtec if she has a headache  In the evening she takes Fioricet and Phenergan so that she can sleep. She notes that she has had bad luck with headache medication in the past.  She has not reached out to neurology yet.   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 Depression  PHQ-9 is 0 and   Last visit 18 consistent with moderate depression  She denies crying spells  She is a very good relationship with her mother. Situational stressors chronic  Sleeping 7 hours but interupted and not well rested  Energy level 5/10    Review of Systems  Constitutional: negative for fevers, chills, anorexia and weight loss  Eyes:   negative for visual disturbance and irritation  ENT:   negative for tinnitus,sore throat,nasal congestion,ear pains. hoarseness  Respiratory:  negative for cough, hemoptysis, dyspnea,wheezing  CV:   negative for chest pain, palpitations, lower extremity edema  GI:   negative for nausea, vomiting, diarrhea, abdominal pain,melena  Endo:               negative for polyuria,polydipsia,polyphagia,heat intolerance  Genitourinary: negative for frequency, dysuria and hematuria  Integument:  negative for rash and pruritus  Hematologic:  negative for easy bruising and gum/nose bleeding  Musculoskel: negative for myalgias, arthralgias, back pain, muscle weakness, joint pain  Neurological:  negative for headaches, dizziness, vertigo, memory problems and gait   Behavl/Psych: negative for feelings of anxiety, depression, mood changes    Past Medical History:   Diagnosis Date    Anxiety     GERD (gastroesophageal reflux disease)     Headache     Nausea & vomiting      Past Surgical History:   Procedure Laterality Date    HX APPENDECTOMY  04/01/2020    HX TOTAL ABDOMINAL HYSTERECTOMY  2007    fibroids and cysts    IMPLANT BREAST SILICONE/EQ      [place] BREAST SURGERY PROCEDURE UNLISTED       Social History     Socioeconomic History    Marital status: SINGLE   Tobacco Use    Smoking status: Former     Packs/day: 1.00     Years: 25.00     Pack years: 25.00     Types: Cigarettes     Quit date: 3/1/2019     Years since quitting: 3.6    Smokeless tobacco: Never    Tobacco comments:     20 cigs/day about 15 years off and on   Vaping Use    Vaping Use: Never used   Substance and Sexual Activity    Alcohol use: Yes     Alcohol/week: 0.0 standard drinks     Comment: occasional    Drug use: No    Sexual activity: Yes     Partners: Male   Social History Narrative    Full time: sedentary     Manageable stress        3 kids raised by herself    20 yo daughter     23 yo twin men        2 children live at home and one of the twins got      Family History   Problem Relation Age of Onset    Thyroid Disease Mother     Cancer Father         lymphoma    Diabetes Father     Heart Disease Father     Hypertension Father     Prostate Cancer Father     Diabetes Maternal Grandmother     Thyroid Disease Maternal Grandmother     Cancer Maternal Grandfather     Thyroid Disease Paternal Grandmother     Breast Cancer Maternal Aunt     Breast Cancer Cousin      Current Outpatient Medications   Medication Sig Dispense Refill    clonazePAM (KlonoPIN) 0.5 mg tablet Take 1 Tablet by mouth nightly as needed for Anxiety. Max Daily Amount: 0.5 mg. 5 Tablet 0    busPIRone (BUSPAR) 5 mg tablet Take 0.5 Tablets by mouth three (3) times daily (with meals). 90 135 Tablet 1    buPROPion SR (WELLBUTRIN SR) 150 mg SR tablet Take 1 Tablet by mouth daily. 30 Tablet 0    diclofenac EC (VOLTAREN) 50 mg EC tablet Take 1 Tablet by mouth two (2) times a day. Take with food and water for about 1-2 weeks  Indications: inflammation of a tendon 21 Tablet 0    Nurtec ODT 75 mg disintegrating tablet TAKE 1 TABLET BY MOUTH ONCE AS NEEDED FOR MIGRAINE FOR UP TO 1 DOSE. 12 Tablet 0    butalbital-acetaminophen-caffeine (FIORICET, ESGIC) -40 mg per tablet Take 1 Tablet by mouth every six (6) hours as needed for Headache. 30 Tablet 0    promethazine (PHENERGAN) 12.5 mg tablet Take 1 Tablet by mouth every six (6) hours as needed for Nausea.  30 Tablet 0    fluticasone propionate (FLONASE) 50 mcg/actuation nasal spray Take 2 Sprays by Both Nostrils route in the morning. 1 Each 1    omeprazole (PRILOSEC) 40 mg capsule Take 1 Capsule by mouth in the morning. 30 Capsule 0     Allergies   Allergen Reactions    Amitriptyline Other (comments)     Significant weight gain       Objective:  Visit Vitals  /82 (BP 1 Location: Left upper arm, BP Patient Position: Sitting, BP Cuff Size: Adult)   Pulse 83   Temp 98.2 °F (36.8 °C) (Oral)   Resp 16   Ht 5' 4\" (1.626 m)   Wt 185 lb 8 oz (84.1 kg)   SpO2 98%   BMI 31.84 kg/m²     Physical Exam:   General appearance - alert, well appearing, and in no distress  Mental status - alert, oriented to person, place, and time  EYE-BENJAMIN, EOMI, fundi normal, corneas normal, no foreign bodies, visual acuity normal both eyes, no periorbital cellulitis  ENT-ENT exam normal, no neck nodes or sinus tenderness  Nose - normal and patent, no erythema, discharge or polyps  Mouth - mucous membranes moist, pharynx normal without lesions  Neck - supple, no significant adenopathy   Chest - clear to auscultation, no wheezes, rales or rhonchi, symmetric air entry   Heart - normal rate, regular rhythm, normal S1, S2, no murmurs, rubs, clicks or gallops   Abdomen - soft, nontender, nondistended, no masses or organomegaly  Lymph- no adenopathy palpable  Ext-peripheral pulses normal, no pedal edema, no clubbing or cyanosis  Skin-Warm and dry.  no hyperpigmentation, vitiligo, or suspicious lesions  Neuro -alert, oriented, normal speech, no focal findings or movement disorder noted  Neck-normal C-spine, no tenderness, full ROM without pain  Gyn not indicated as pt with MARY bso      Results for orders placed or performed in visit on 09/29/22   TSH 3RD GENERATION   Result Value Ref Range    TSH 1.90 0.36 - 3.74 uIU/mL   CBC W/O DIFF   Result Value Ref Range    WBC 6.2 3.6 - 11.0 K/uL    RBC 4.98 3.80 - 5.20 M/uL    HGB 15.0 11.5 - 16.0 g/dL    HCT 46.2 35.0 - 47.0 %    MCV 92.8 80.0 - 99.0 FL    MCH 30.1 26.0 - 34.0 PG    MCHC 32.5 30.0 - 36.5 g/dL    RDW 13.2 11.5 - 14.5 %    PLATELET 740 039 - 126 K/uL    MPV 9.1 8.9 - 12.9 FL    NRBC 0.0 0  WBC    ABSOLUTE NRBC 0.00 0.00 - 7.42 K/uL   METABOLIC PANEL, COMPREHENSIVE   Result Value Ref Range    Sodium 138 136 - 145 mmol/L    Potassium 4.3 3.5 - 5.1 mmol/L    Chloride 104 97 - 108 mmol/L    CO2 29 21 - 32 mmol/L    Anion gap 5 5 - 15 mmol/L    Glucose 99 65 - 100 mg/dL    BUN 22 (H) 6 - 20 MG/DL    Creatinine 0.85 0.55 - 1.02 MG/DL    BUN/Creatinine ratio 26 (H) 12 - 20      GFR est AA >60 >60 ml/min/1.73m2    GFR est non-AA >60 >60 ml/min/1.73m2    Calcium 9.9 8.5 - 10.1 MG/DL    Bilirubin, total 0.4 0.2 - 1.0 MG/DL    ALT (SGPT) 37 12 - 78 U/L    AST (SGOT) 13 (L) 15 - 37 U/L    Alk. phosphatase 112 45 - 117 U/L    Protein, total 7.4 6.4 - 8.2 g/dL    Albumin 4.3 3.5 - 5.0 g/dL    Globulin 3.1 2.0 - 4.0 g/dL    A-G Ratio 1.4 1.1 - 2.2     LIPID PANEL   Result Value Ref Range    Cholesterol, total 271 (H) <200 MG/DL    Triglyceride 208 (H) <150 MG/DL    HDL Cholesterol 65 MG/DL    LDL, calculated 164.4 (H) 0 - 100 MG/DL    VLDL, calculated 41.6 MG/DL    CHOL/HDL Ratio 4.2 0.0 - 5.0     HEPATITIS C AB   Result Value Ref Range    Hep C virus Ab Interp.  NONREACTIVE NONREACTIVE       Prevention    Cardiovascular profile  Family hx  Exercisin [address] riding bikees  Blood pressure:  Health healthy diet:  Diabetes:  Cholesterol:  Renal function:      Cancer risk profile  Mammogram , [facility] good report due 7/15 cig smoking  Lung no sx  Colonoscopy no blood stool  Skin nonhealing in 2 weeks no sx  Gyn abnormal bleeding/discharge/abd pain/pressure no sx, sees gyn      Thyroid sx  + anxiety situatioanally related ot son    Osteopenia prevention  Calcium 1000mg/day yes  Vitamin D 800iu/day yes    Mental health scale: 7-8/10  Depression  Anxiety  Sleep # of hours:  Energy Level:        Immunizations  TDAP defer  Pneumonia vaccine  Flu vaccine  Shingles vaccine  HPV

## 2022-11-15 RX ORDER — TIRZEPATIDE 2.5 MG/.5ML
1 INJECTION, SOLUTION SUBCUTANEOUS
Qty: 1 BOX | Refills: 1 | Status: SHIPPED | OUTPATIENT
Start: 2022-11-15

## 2023-01-03 DIAGNOSIS — F41.9 ANXIETY: ICD-10-CM

## 2023-01-03 RX ORDER — BUSPIRONE HYDROCHLORIDE 5 MG/1
5 TABLET ORAL
Qty: 270 TABLET | Refills: 1 | Status: SHIPPED | OUTPATIENT
Start: 2023-01-03

## 2023-02-17 DIAGNOSIS — G43.119 INTRACTABLE MIGRAINE WITH AURA WITHOUT STATUS MIGRAINOSUS: ICD-10-CM

## 2023-02-20 RX ORDER — RIMEGEPANT SULFATE 75 MG/75MG
TABLET, ORALLY DISINTEGRATING ORAL
Qty: 12 TABLET | Refills: 0 | Status: SHIPPED | OUTPATIENT
Start: 2023-02-20

## 2023-03-06 ENCOUNTER — PATIENT MESSAGE (OUTPATIENT)
Dept: INTERNAL MEDICINE CLINIC | Age: 53
End: 2023-03-06

## 2023-03-06 DIAGNOSIS — G43.119 INTRACTABLE MIGRAINE WITH AURA WITHOUT STATUS MIGRAINOSUS: ICD-10-CM

## 2023-03-06 RX ORDER — RIMEGEPANT SULFATE 75 MG/75MG
TABLET, ORALLY DISINTEGRATING ORAL
Qty: 12 TABLET | Refills: 0 | Status: SHIPPED | OUTPATIENT
Start: 2023-03-06

## 2023-03-06 NOTE — TELEPHONE ENCOUNTER
From: Ofelia Garcia  To: Michael Slade MD  Sent: 3/6/2023 11:46 AM EST  Subject: Change Pharmacy    Good afternoon! Can you please change my pharmacy from     [phone]    Are you able to transfer my prescriptions that I take daily there? Or will I need to request refill when the time comes? The latest Nurtec has not been filled. Can you please send it to [facility]? Thanks so much! I'm sorry for this confusion.  Our [facility] is has HORRIBLE Customer service

## 2023-04-21 DIAGNOSIS — R92.8 FOLLOW-UP EXAMINATION OF ABNORMAL MAMMOGRAM: Primary | ICD-10-CM

## 2023-04-26 ENCOUNTER — OFFICE VISIT (OUTPATIENT)
Dept: INTERNAL MEDICINE CLINIC | Age: 53
End: 2023-04-26
Payer: COMMERCIAL

## 2023-04-26 VITALS
SYSTOLIC BLOOD PRESSURE: 129 MMHG | DIASTOLIC BLOOD PRESSURE: 81 MMHG | HEART RATE: 86 BPM | WEIGHT: 174.4 LBS | BODY MASS INDEX: 29.78 KG/M2 | OXYGEN SATURATION: 98 % | TEMPERATURE: 98.2 F | HEIGHT: 64 IN | RESPIRATION RATE: 14 BRPM

## 2023-04-26 DIAGNOSIS — R10.31 RIGHT LOWER QUADRANT PAIN: ICD-10-CM

## 2023-04-26 DIAGNOSIS — R05.1 ACUTE COUGH: Primary | ICD-10-CM

## 2023-04-26 DIAGNOSIS — G43.119 INTRACTABLE MIGRAINE WITH AURA WITHOUT STATUS MIGRAINOSUS: ICD-10-CM

## 2023-04-26 DIAGNOSIS — F41.9 ANXIETY: ICD-10-CM

## 2023-04-26 DIAGNOSIS — J01.10 SUBACUTE FRONTAL SINUSITIS: ICD-10-CM

## 2023-04-26 DIAGNOSIS — F32.A DEPRESSION, UNSPECIFIED DEPRESSION TYPE: ICD-10-CM

## 2023-04-26 LAB
QUICKVUE INFLUENZA TEST: NEGATIVE
SARS-COV-2 POC: NEGATIVE
VALID INTERNAL CONTROL?: YES

## 2023-04-26 PROCEDURE — 87426 SARSCOV CORONAVIRUS AG IA: CPT | Performed by: INTERNAL MEDICINE

## 2023-04-26 PROCEDURE — 99214 OFFICE O/P EST MOD 30 MIN: CPT | Performed by: INTERNAL MEDICINE

## 2023-04-26 PROCEDURE — 87804 INFLUENZA ASSAY W/OPTIC: CPT | Performed by: INTERNAL MEDICINE

## 2023-04-26 RX ORDER — BUSPIRONE HYDROCHLORIDE 5 MG/1
5 TABLET ORAL
Qty: 270 TABLET | Refills: 3 | Status: SHIPPED | OUTPATIENT
Start: 2023-04-26

## 2023-04-26 RX ORDER — DOXYCYCLINE 100 MG/1
100 TABLET ORAL 2 TIMES DAILY
Qty: 20 TABLET | Refills: 0 | Status: SHIPPED | OUTPATIENT
Start: 2023-04-26

## 2023-04-26 RX ORDER — BUPROPION HYDROCHLORIDE 150 MG/1
150 TABLET, EXTENDED RELEASE ORAL DAILY
Qty: 90 TABLET | Refills: 3 | Status: SHIPPED | OUTPATIENT
Start: 2023-04-26

## 2023-04-26 RX ORDER — CLONAZEPAM 0.5 MG/1
0.5 TABLET ORAL
Qty: 5 TABLET | Refills: 0 | Status: SHIPPED | OUTPATIENT
Start: 2023-04-26

## 2023-04-26 RX ORDER — RIMEGEPANT SULFATE 75 MG/75MG
TABLET, ORALLY DISINTEGRATING ORAL
Qty: 12 TABLET | Refills: 4 | Status: SHIPPED | OUTPATIENT
Start: 2023-04-26

## 2023-04-26 NOTE — PROGRESS NOTES
Diagnoses and all orders for this visit:    1. Acute cough  COVID and flu negative  -     AMB POC RAPID INFLUENZA TEST  -     AMB POC SARS-COV-2    2. Intractable migraine with aura without status migrainosus  Able  -     Nurtec ODT 75 mg disintegrating tablet; TAKE 1 TABLET BY MOUTH ONCE AS NEEDED FOR MIGRAINE FOR UP TO 1 DOSE.  -     REFERRAL TO GASTROENTEROLOGY    3. Anxiety  Stable  -     busPIRone (BUSPAR) 5 mg tablet; Take 1 Tablet by mouth three (3) times daily (with meals). 90    4. Depression, unspecified depression type  Improved  Continue Wellbutrin  -     buPROPion SR (WELLBUTRIN SR) 150 mg SR tablet; Take 1 Tablet by mouth daily. 5. Right lower quadrant pain  Was seen by Dr. Shagufta Milner and not resolving  Encouraged continued MiraLAX  CT not revealing  History of appendectomy and possibly adhesions  Continue stool softeners and fluids  Suspect this is constipation however she has not had her colonoscopy and referred her to GI      6. Subacute frontal sinusitis  Symptoms for 10 days not resolving    -     doxycycline (ADOXA) 100 mg tablet; Take 1 Tablet by mouth two (2) times a day. BMI 29  Weight loss with dietary changes  Was not able to get [facility] but glad due to side effects        Chief Complaint   Patient presents with    Cough     Productive cough - with green/yellow mucus since Saturday. Sinus Pain    Fatigue              Sinusitis  Patient reports that she has been having sinus pressure and cough for over a week. No fevers but feels weak. She has been taking Flonase still feeling weak with pressure in her face and having productive cough. Not smokier        Bmi improved  Not on mounjaro. She has been able to change diet        Right pain LLQ  Patient reports that she has been having some intermittent right lower quadrant pain since her last visit with Dr. Shagufta Milner.  CT reviewed and negative. She has been taking her stool softeners.   Symptoms are in the right lower quadrant and also up along her right side. She has not had colonoscopy yet    Migraines  Uses butalbital only seldomly. She states her Nurtec for very bad headaches. Her insurance is covering her Nurtec which seems to work very well for her. Situational stress  Father recently discharged from hospital.  Mental health stable on Wellbutrin and BuSpar      Past Medical History:   Diagnosis Date    Anxiety     GERD (gastroesophageal reflux disease)     Headache     Nausea & vomiting      Past Surgical History:   Procedure Laterality Date    HX APPENDECTOMY  2020    HX TOTAL ABDOMINAL HYSTERECTOMY  2007    fibroids and cysts    IMPLANT BREAST SILICONE/EQ      LA UNLISTED PROCEDURE BREAST       Social History     Socioeconomic History    Marital status: SINGLE   Tobacco Use    Smoking status: Former     Packs/day: 1.00     Years: 25.00     Pack years: 25.00     Types: Cigarettes     Quit date: 3/1/2019     Years since quittin.1    Smokeless tobacco: Never    Tobacco comments:     20 cigs/day about 15 years off and on   Vaping Use    Vaping Use: Never used   Substance and Sexual Activity    Alcohol use:  Yes     Alcohol/week: 0.0 standard drinks     Comment: occasional    Drug use: No    Sexual activity: Yes     Partners: Male   Social History Narrative    Full time: sedentary     Manageable stress        3 kids raised by herself    22 yo daughter     25 yo twin men        2 children live at home and one of the twins got      Family History   Problem Relation Age of Onset    Thyroid Disease Mother     Cancer Father         lymphoma    Diabetes Father     Heart Disease Father     Hypertension Father     Prostate Cancer Father     Diabetes Maternal Grandmother     Thyroid Disease Maternal Grandmother     Cancer Maternal Grandfather     Thyroid Disease Paternal Grandmother     Breast Cancer Maternal Aunt     Breast Cancer Cousin      Current Outpatient Medications   Medication Sig Dispense Refill    Nurtec ODT 75 mg disintegrating tablet TAKE 1 TABLET BY MOUTH ONCE AS NEEDED FOR MIGRAINE FOR UP TO 1 DOSE. 12 Tablet 0    busPIRone (BUSPAR) 5 mg tablet Take 1 Tablet by mouth three (3) times daily (with meals). 90 270 Tablet 1    buPROPion SR (WELLBUTRIN SR) 150 mg SR tablet Take 1 Tablet by mouth daily. 90 Tablet 1    clonazePAM (KlonoPIN) 0.5 mg tablet Take 1 Tablet by mouth nightly as needed for Anxiety. Max Daily Amount: 0.5 mg. 5 Tablet 0    butalbital-acetaminophen-caffeine (FIORICET, ESGIC) -40 mg per tablet Take 1 Tablet by mouth every six (6) hours as needed for Headache. 30 Tablet 0    promethazine (PHENERGAN) 12.5 mg tablet Take 1 Tablet by mouth every six (6) hours as needed for Nausea. 30 Tablet 0    fluticasone propionate (FLONASE) 50 mcg/actuation nasal spray Take 2 Sprays by Both Nostrils route in the morning. 1 Each 1    diclofenac EC (VOLTAREN) 50 mg EC tablet Take 1 Tablet by mouth two (2) times a day. Take with food and water for about 1-2 weeks 21 Tablet 0    omeprazole (PRILOSEC) 40 mg capsule Take 1 Capsule by mouth in the morning.  30 Capsule 0     Allergies   Allergen Reactions    Amitriptyline Other (comments)     Significant weight gain       Review of Systems - General ROS: negative for - chills or fever  Cardiovascular ROS: no chest pain or dyspnea on exertion  Respiratory ROS: no cough, shortness of breath, or wheezing    Visit Vitals  /81 (BP 1 Location: Left upper arm, BP Patient Position: Sitting, BP Cuff Size: Small adult)   Pulse 86   Temp 98.2 °F (36.8 °C) (Oral)   Resp 14   Ht 5' 4\" (1.626 m)   Wt 174 lb 6.4 oz (79.1 kg)   SpO2 98%   BMI 29.94 kg/m²     Constitutional: [x] Appears well-developed and well-nourished [x] No apparent distress      [] Abnormal -     Mental status: [x] Alert and awake  [x] Oriented to person/place/time [x] Able to follow commands    [] Abnormal -     Eyes:   EOM    [x]  Normal    [] Abnormal -   Sclera  [x]  Normal    [] Abnormal -          Discharge [x] None visible   [] Abnormal -     HENT: [x] Normocephalic, atraumatic  [] Abnormal -   [x] Mouth/Throat: Mucous membranes are moist    External Ears [x] Normal  [] Abnormal -    Neck: [x] No visualized mass [] Abnormal -     Pulmonary/Chest: [x] Respiratory effort normal   [x] No visualized signs of difficulty breathing or respiratory distress        [] Abnormal -      Musculoskeletal:   [x] Normal gait with no signs of ataxia         [x] Normal range of motion of neck        [] Abnormal -     Neurological:        [x] No Facial Asymmetry (Cranial nerve 7 motor function) (limited exam due to video visit)          [x] No gaze palsy        [] Abnormal -          Skin:        [x] No significant exanthematous lesions or discoloration noted on facial skin         [] Abnormal -            Psychiatric:       [x] Normal Affect [] Abnormal -        [x] No Hallucinations        This medical record was transcribed using an electronic medical records/speech recognition system. Although proofread, it may and can contain electronic, spelling and other errors. Corrections may be executed at a later time. Please contact us for any clarifications as needed.

## 2023-05-13 DIAGNOSIS — F32.A DEPRESSION, UNSPECIFIED: ICD-10-CM

## 2023-05-15 RX ORDER — BUSPIRONE HYDROCHLORIDE 5 MG/1
5 TABLET ORAL 3 TIMES DAILY
COMMUNITY
Start: 2023-04-26

## 2023-05-15 RX ORDER — CLONAZEPAM 0.5 MG/1
0.5 TABLET ORAL PRN
COMMUNITY
Start: 2023-04-26

## 2023-05-15 RX ORDER — RIMEGEPANT SULFATE 75 MG/75MG
75 TABLET, ORALLY DISINTEGRATING ORAL
COMMUNITY
Start: 2023-04-27

## 2023-05-15 RX ORDER — BUPROPION HYDROCHLORIDE 150 MG/1
150 TABLET, EXTENDED RELEASE ORAL DAILY
COMMUNITY
Start: 2023-04-26

## 2023-05-15 RX ORDER — BUPROPION HYDROCHLORIDE 150 MG/1
TABLET, EXTENDED RELEASE ORAL
Qty: 90 TABLET | Refills: 1 | OUTPATIENT
Start: 2023-05-15

## 2023-05-18 RX ORDER — BUPROPION HYDROCHLORIDE 150 MG/1
150 TABLET, EXTENDED RELEASE ORAL DAILY
COMMUNITY
Start: 2023-04-26

## 2023-05-18 RX ORDER — RIMEGEPANT SULFATE 75 MG/75MG
TABLET, ORALLY DISINTEGRATING ORAL
COMMUNITY
Start: 2023-04-26

## 2023-05-18 RX ORDER — PROMETHAZINE HYDROCHLORIDE 12.5 MG/1
12.5 TABLET ORAL EVERY 6 HOURS PRN
COMMUNITY
Start: 2022-09-29

## 2023-05-18 RX ORDER — FLUTICASONE PROPIONATE 50 MCG
2 SPRAY, SUSPENSION (ML) NASAL DAILY
COMMUNITY
Start: 2022-08-10

## 2023-05-18 RX ORDER — DOXYCYCLINE 100 MG/1
100 TABLET ORAL 2 TIMES DAILY
COMMUNITY
Start: 2023-04-26

## 2023-05-18 RX ORDER — BUSPIRONE HYDROCHLORIDE 5 MG/1
5 TABLET ORAL
COMMUNITY
Start: 2023-04-26

## 2023-05-18 RX ORDER — BUTALBITAL, ACETAMINOPHEN AND CAFFEINE 50; 325; 40 MG/1; MG/1; MG/1
1 TABLET ORAL EVERY 6 HOURS PRN
COMMUNITY
Start: 2022-09-29

## 2023-05-18 RX ORDER — CLONAZEPAM 0.5 MG/1
0.5 TABLET ORAL
COMMUNITY
Start: 2023-04-26

## 2023-09-15 ENCOUNTER — CLINICAL DOCUMENTATION (OUTPATIENT)
Age: 53
End: 2023-09-15

## 2023-09-15 NOTE — PROGRESS NOTES
PA SUBMITTED FOR NURTE VIA [employer], WAITING FOR RESPONSE FROM INSURANCE COMPANY Key: [id number]    MEDICATION APPROVED, MYCHART MSG SENT TO PT

## 2023-09-21 ENCOUNTER — CLINICAL DOCUMENTATION (OUTPATIENT)
Age: 53
End: 2023-09-21

## 2023-09-21 NOTE — PROGRESS NOTES
Bhanu Tran (Resendez: [id number]) - [id number]  Status: PA Response - ApprovedCreated: September 11th, 2023Sent: September 15th, 2023    [facility]

## 2024-02-19 RX ORDER — RIMEGEPANT SULFATE 75 MG/75MG
TABLET, ORALLY DISINTEGRATING ORAL
Qty: 12 TABLET | Refills: 0 | Status: SHIPPED | OUTPATIENT
Start: 2024-02-19

## 2024-02-27 ENCOUNTER — OFFICE VISIT (OUTPATIENT)
Age: 54
End: 2024-02-27
Payer: COMMERCIAL

## 2024-02-27 VITALS
BODY MASS INDEX: 30.32 KG/M2 | DIASTOLIC BLOOD PRESSURE: 78 MMHG | SYSTOLIC BLOOD PRESSURE: 120 MMHG | WEIGHT: 177.6 LBS | HEIGHT: 64 IN | HEART RATE: 87 BPM | TEMPERATURE: 98 F | RESPIRATION RATE: 14 BRPM | OXYGEN SATURATION: 96 %

## 2024-02-27 DIAGNOSIS — L20.82 FLEXURAL ECZEMA: ICD-10-CM

## 2024-02-27 DIAGNOSIS — F43.9 SITUATIONAL STRESS: ICD-10-CM

## 2024-02-27 DIAGNOSIS — G43.119 INTRACTABLE MIGRAINE WITH AURA WITHOUT STATUS MIGRAINOSUS: Primary | ICD-10-CM

## 2024-02-27 DIAGNOSIS — Z63.4 BEREAVEMENT: ICD-10-CM

## 2024-02-27 DIAGNOSIS — F32.1 MAJOR DEPRESSIVE DISORDER, SINGLE EPISODE, MODERATE (HCC): ICD-10-CM

## 2024-02-27 PROCEDURE — 99214 OFFICE O/P EST MOD 30 MIN: CPT | Performed by: INTERNAL MEDICINE

## 2024-02-27 RX ORDER — OMEPRAZOLE 40 MG/1
CAPSULE, DELAYED RELEASE ORAL
COMMUNITY
Start: 2024-02-16

## 2024-02-27 RX ORDER — RIMEGEPANT SULFATE 75 MG/75MG
1 TABLET, ORALLY DISINTEGRATING ORAL EVERY OTHER DAY
Qty: 24 TABLET | Refills: 3 | Status: SHIPPED | OUTPATIENT
Start: 2024-02-27

## 2024-02-27 RX ORDER — BUPROPION HYDROCHLORIDE 150 MG/1
150 TABLET, EXTENDED RELEASE ORAL 2 TIMES DAILY
Qty: 60 TABLET | Refills: 2 | Status: SHIPPED | OUTPATIENT
Start: 2024-02-27

## 2024-02-27 RX ORDER — BUSPIRONE HYDROCHLORIDE 5 MG/1
5 TABLET ORAL 3 TIMES DAILY
Qty: 270 TABLET | Refills: 1 | Status: SHIPPED | OUTPATIENT
Start: 2024-02-27

## 2024-02-27 RX ORDER — CYCLOBENZAPRINE HCL 5 MG
10 TABLET ORAL 2 TIMES DAILY PRN
Qty: 28 TABLET | Refills: 1 | Status: SHIPPED | OUTPATIENT
Start: 2024-02-27 | End: 2024-02-27

## 2024-02-27 RX ORDER — CLONAZEPAM 0.5 MG/1
0.25 TABLET ORAL 2 TIMES DAILY PRN
Qty: 20 TABLET | Refills: 0 | Status: SHIPPED | OUTPATIENT
Start: 2024-02-27 | End: 2024-03-18

## 2024-02-27 RX ORDER — TRIAMCINOLONE ACETONIDE 1 MG/G
CREAM TOPICAL
Qty: 15 G | Refills: 0 | Status: SHIPPED | OUTPATIENT
Start: 2024-02-27

## 2024-02-27 SDOH — ECONOMIC STABILITY: FOOD INSECURITY: WITHIN THE PAST 12 MONTHS, THE FOOD YOU BOUGHT JUST DIDN'T LAST AND YOU DIDN'T HAVE MONEY TO GET MORE.: NEVER TRUE

## 2024-02-27 SDOH — SOCIAL STABILITY - SOCIAL INSECURITY: DISSAPEARANCE AND DEATH OF FAMILY MEMBER: Z63.4

## 2024-02-27 SDOH — ECONOMIC STABILITY: HOUSING INSECURITY
IN THE LAST 12 MONTHS, WAS THERE A TIME WHEN YOU DID NOT HAVE A STEADY PLACE TO SLEEP OR SLEPT IN A SHELTER (INCLUDING NOW)?: NO

## 2024-02-27 SDOH — ECONOMIC STABILITY: INCOME INSECURITY: HOW HARD IS IT FOR YOU TO PAY FOR THE VERY BASICS LIKE FOOD, HOUSING, MEDICAL CARE, AND HEATING?: NOT HARD AT ALL

## 2024-02-27 SDOH — ECONOMIC STABILITY: FOOD INSECURITY: WITHIN THE PAST 12 MONTHS, YOU WORRIED THAT YOUR FOOD WOULD RUN OUT BEFORE YOU GOT MONEY TO BUY MORE.: NEVER TRUE

## 2024-02-27 ASSESSMENT — PATIENT HEALTH QUESTIONNAIRE - PHQ9
SUM OF ALL RESPONSES TO PHQ QUESTIONS 1-9: 6
1. LITTLE INTEREST OR PLEASURE IN DOING THINGS: 3
SUM OF ALL RESPONSES TO PHQ9 QUESTIONS 1 & 2: 6
SUM OF ALL RESPONSES TO PHQ QUESTIONS 1-9: 6
2. FEELING DOWN, DEPRESSED OR HOPELESS: 3

## 2024-02-27 NOTE — PROGRESS NOTES
ASSESSMENT & PLAN:  1. Intractable migraine with aura without status migrainosus  Persistent symptoms  Has had some difficulty obtaining Nurtec from pharmacy  Defers neurology evaluation for now  Breakthrough migraine using Fioricet and Phenergan--seldomly uses    Continue Nurtec-     NURTEC 75 MG TBDP; Take 1 tablet by mouth every other day, Disp-24 tablet, R-3, DAWPlease allow coupon cardNormal  2. Flexural eczema  Appears to have Flexeril eczema on fingers  Will try triamcinolone  Needs follow-up with dermatology anyway and should have skin cancer screening  As well-     External Referral To Dermatology  -     triamcinolone (KENALOG) 0.1 % cream; Apply topically 2 times daily.for a week, Disp-15 g, R-0, Normal  3. Major depressive disorder, single episode, moderate (HCC)  Not controlled  Increase Wellbutrin 150 mg twice daily-     buPROPion (WELLBUTRIN SR) 150 MG extended release tablet; Take 1 tablet by mouth 2 times daily, Disp-60 tablet, R-2Normal  Encouraged her to see [facility] for counseling.  She has this available at her work  4. Bereavement  Sadly had to put down her dog of 17 years day after Melyssa.  She is still been struggling with this.      5. Situational stress  Not well-controlled  Aside from her dog passing she now has to put her cat down  Family stress with father with dementia  Work stress    -     busPIRone (BUSPAR) 5 MG tablet; Take 1 tablet by mouth 3 times daily, Disp-270 tablet, R-1Normal  -     clonazePAM (KLONOPIN) 0.5 MG tablet; Take 0.5 tablets by mouth 2 times daily as needed for Anxiety for up to 20 days. Do not drive if taking medication, cautim may cause sedation Max Daily Amount: 0.5 mg, Disp-20 tablet, R-0Normal          BMI 29  Weight loss with dietary changes  Was not able to get Mounjaro but glad due to side effects    Follow-up in 3 months regarding her Wellbutrin and depression symptoms.    Patient presents for follow-up with regards to her migraines, depression symptoms and

## 2024-05-25 DIAGNOSIS — F32.1 MAJOR DEPRESSIVE DISORDER, SINGLE EPISODE, MODERATE (HCC): ICD-10-CM

## 2024-05-28 ENCOUNTER — OFFICE VISIT (OUTPATIENT)
Age: 54
End: 2024-05-28
Payer: COMMERCIAL

## 2024-05-28 VITALS
WEIGHT: 186.2 LBS | BODY MASS INDEX: 31.79 KG/M2 | RESPIRATION RATE: 14 BRPM | DIASTOLIC BLOOD PRESSURE: 79 MMHG | OXYGEN SATURATION: 94 % | HEIGHT: 64 IN | HEART RATE: 85 BPM | SYSTOLIC BLOOD PRESSURE: 116 MMHG

## 2024-05-28 DIAGNOSIS — Z12.31 ENCOUNTER FOR SCREENING MAMMOGRAM FOR MALIGNANT NEOPLASM OF BREAST: ICD-10-CM

## 2024-05-28 DIAGNOSIS — F41.9 ANXIETY: ICD-10-CM

## 2024-05-28 DIAGNOSIS — G43.019 INTRACTABLE MIGRAINE WITHOUT AURA AND WITHOUT STATUS MIGRAINOSUS: ICD-10-CM

## 2024-05-28 DIAGNOSIS — E78.00 ELEVATED LDL CHOLESTEROL LEVEL: ICD-10-CM

## 2024-05-28 PROCEDURE — 99215 OFFICE O/P EST HI 40 MIN: CPT | Performed by: INTERNAL MEDICINE

## 2024-05-28 RX ORDER — BUPROPION HYDROCHLORIDE 150 MG/1
150 TABLET, EXTENDED RELEASE ORAL 2 TIMES DAILY
Qty: 60 TABLET | Refills: 2 | Status: SHIPPED | OUTPATIENT
Start: 2024-05-28

## 2024-05-28 RX ORDER — NALTREXONE HYDROCHLORIDE 50 MG/1
TABLET, FILM COATED ORAL
Qty: 60 TABLET | Refills: 1 | Status: SHIPPED | OUTPATIENT
Start: 2024-05-28

## 2024-05-28 ASSESSMENT — PATIENT HEALTH QUESTIONNAIRE - PHQ9
SUM OF ALL RESPONSES TO PHQ QUESTIONS 1-9: 0
2. FEELING DOWN, DEPRESSED OR HOPELESS: NOT AT ALL
SUM OF ALL RESPONSES TO PHQ QUESTIONS 1-9: 0
SUM OF ALL RESPONSES TO PHQ QUESTIONS 1-9: 0
SUM OF ALL RESPONSES TO PHQ9 QUESTIONS 1 & 2: 0
SUM OF ALL RESPONSES TO PHQ QUESTIONS 1-9: 0
1. LITTLE INTEREST OR PLEASURE IN DOING THINGS: NOT AT ALL

## 2024-05-28 NOTE — PROGRESS NOTES
well-nourished [x] No apparent distress      [] Abnormal -     Mental status: [x] Alert and awake  [x] Oriented to person/place/time [x] Able to follow commands    [] Abnormal -     Eyes:   EOM    [x]  Normal    [] Abnormal -   Sclera  [x]  Normal    [] Abnormal -          Discharge [x]  None visible   [] Abnormal -     HENT: [x] Normocephalic, atraumatic  [] Abnormal -   [x] Mouth/Throat: Mucous membranes are moist    External Ears [x] Normal  [] Abnormal -    Neck: [x] No visualized mass [] Abnormal -     Pulmonary/Chest: [x] Respiratory effort normal   [x] No visualized signs of difficulty breathing or respiratory distress        [] Abnormal -      Musculoskeletal:   [x] Normal gait with no signs of ataxia         [x] Normal range of motion of neck        [] Abnormal -     Neurological:        [x] No Facial Asymmetry (Cranial nerve 7 motor function) (limited exam due to video visit)          [x] No gaze palsy        [] Abnormal -          Skin:        [x] No significant exanthematous lesions or discoloration noted on facial skin         [] Abnormal -            Psychiatric:       [x] Normal Affect [] Abnormal -        [x] No Hallucinations        This medical record was transcribed using an electronic medical records/speech recognition system.  Although proofread, it may and can contain electronic, spelling and other errors.  Corrections may be executed at a later time.  Please contact us for any clarifications as neededOn this date 05/28/24  I have spent 46 minutes reviewing previous notes, test results and face to face with the patient discussing the diagnosis and importance of compliance with the treatment plan as well as documenting on the day of the visit.

## 2024-06-03 ENCOUNTER — HOSPITAL ENCOUNTER (OUTPATIENT)
Facility: HOSPITAL | Age: 54
Discharge: HOME | End: 2024-06-06
Attending: INTERNAL MEDICINE
Payer: COMMERCIAL

## 2024-06-03 VITALS — WEIGHT: 186 LBS | HEIGHT: 64 IN | BODY MASS INDEX: 31.76 KG/M2

## 2024-06-03 DIAGNOSIS — Z12.31 ENCOUNTER FOR SCREENING MAMMOGRAM FOR MALIGNANT NEOPLASM OF BREAST: ICD-10-CM

## 2024-06-03 PROCEDURE — 77063 BREAST TOMOSYNTHESIS BI: CPT

## 2024-06-10 DIAGNOSIS — F43.9 SITUATIONAL STRESS: ICD-10-CM

## 2024-06-10 RX ORDER — BUSPIRONE HYDROCHLORIDE 5 MG/1
5 TABLET ORAL 3 TIMES DAILY
Qty: 270 TABLET | Refills: 0 | Status: SHIPPED | OUTPATIENT
Start: 2024-06-10

## 2024-06-13 ENCOUNTER — OFFICE VISIT (OUTPATIENT)
Age: 54
End: 2024-06-13
Payer: COMMERCIAL

## 2024-06-13 VITALS
OXYGEN SATURATION: 97 % | BODY MASS INDEX: 30.77 KG/M2 | SYSTOLIC BLOOD PRESSURE: 110 MMHG | HEART RATE: 89 BPM | TEMPERATURE: 98.1 F | WEIGHT: 180.2 LBS | DIASTOLIC BLOOD PRESSURE: 72 MMHG | RESPIRATION RATE: 16 BRPM | HEIGHT: 64 IN

## 2024-06-13 DIAGNOSIS — R00.2 PALPITATIONS: Primary | ICD-10-CM

## 2024-06-13 DIAGNOSIS — F41.9 ANXIETY DISORDER, UNSPECIFIED TYPE: ICD-10-CM

## 2024-06-13 DIAGNOSIS — E78.5 DYSLIPIDEMIA: ICD-10-CM

## 2024-06-13 DIAGNOSIS — F32.1 MAJOR DEPRESSIVE DISORDER, SINGLE EPISODE, MODERATE (HCC): ICD-10-CM

## 2024-06-13 DIAGNOSIS — R00.2 PALPITATIONS: ICD-10-CM

## 2024-06-13 PROCEDURE — 99214 OFFICE O/P EST MOD 30 MIN: CPT | Performed by: NURSE PRACTITIONER

## 2024-06-13 PROCEDURE — 93000 ELECTROCARDIOGRAM COMPLETE: CPT | Performed by: NURSE PRACTITIONER

## 2024-06-13 ASSESSMENT — ENCOUNTER SYMPTOMS
ABDOMINAL PAIN: 0
SHORTNESS OF BREATH: 0
EYE REDNESS: 0
BACK PAIN: 0
DIARRHEA: 0
RESPIRATORY NEGATIVE: 1
VOMITING: 0
BLOOD IN STOOL: 0
COUGH: 0
EYES NEGATIVE: 1
CHEST TIGHTNESS: 0
SINUS PAIN: 0
GASTROINTESTINAL NEGATIVE: 1
EYE PAIN: 0
CONSTIPATION: 0
SINUS PRESSURE: 0
RHINORRHEA: 0
NAUSEA: 0

## 2024-06-13 NOTE — PROGRESS NOTES
Assessment and Plan     1. Palpitations: Symptoms have resolved as of this morning, became more infrequent since yesterday. EKG shows sinus rhythm, benign physical exam. Low risks for cardiovascular dx. Lab work ordered. Will consider Holter monitor if continue. SXS to seek urgent care discussed. Pt verbalized understanding.   -     AMB POC EKG ROUTINE  -     T4, Free; Future  -     TSH; Future  -     CBC with Auto Differential; Future  2. Major depressive disorder, single episode, moderate (HCC): Stable. Continue with Bupropion  -     CBC with Auto Differential; Future  3. Anxiety disorder, unspecified type: Stable. Takes Buspirone 1-2 per day on average, occasional Clonazepam use  -     TSH; Future  4. Dyslipidemia: Diet-controlled. Will check lipids   -     Comprehensive Metabolic Panel; Future  -     Lipid Panel; Future  -     Hemoglobin A1C; Future       Benefits, risks, possible drug interactions, and side effects of all new medications were reviewed with the patient. Pt verbalized understanding.    An electronic signature was used to authenticate this note.  CRISTOBAL Loo - CNP  6/13/2024      Follow-up and Dispositions    Return in about 4 weeks (around 7/11/2024).          History of Present Illness   Chief Complaint     Laurel Paniagua is a 53 y.o. female here for had concerns including Palpitations.   Mrs. Paniagua presents today with reports of palpitation episodes for the past 3 days. Reports past Monday and Tuesday she experienced intermittent episodes, every 5-10 min and lasted for about 15 seconds. Coughing and taking a deep breath will resolve them. She admits weakness during some of the episodes. Yesterday episodes became more sporadic and resolved after noon. She has had one episode of palpitations today that last about 5 seconds. Denies history of heart disease, has never seen a cardiologist. Did not check HR at home or during episode. She stays well-hydrated. Drinks 2 cups of  None

## 2024-06-16 ENCOUNTER — HOSPITAL ENCOUNTER (EMERGENCY)
Facility: HOSPITAL | Age: 54
Discharge: HOME | End: 2024-06-16
Attending: STUDENT IN AN ORGANIZED HEALTH CARE EDUCATION/TRAINING PROGRAM
Payer: COMMERCIAL

## 2024-06-16 ENCOUNTER — APPOINTMENT (OUTPATIENT)
Facility: HOSPITAL | Age: 54
End: 2024-06-16
Payer: COMMERCIAL

## 2024-06-16 VITALS
BODY MASS INDEX: 30.73 KG/M2 | DIASTOLIC BLOOD PRESSURE: 72 MMHG | TEMPERATURE: 97.6 F | RESPIRATION RATE: 20 BRPM | WEIGHT: 180 LBS | SYSTOLIC BLOOD PRESSURE: 126 MMHG | HEART RATE: 68 BPM | HEIGHT: 64 IN | OXYGEN SATURATION: 98 %

## 2024-06-16 DIAGNOSIS — R79.89 ELEVATED SERUM CREATININE: ICD-10-CM

## 2024-06-16 DIAGNOSIS — R07.9 CHEST PAIN, UNSPECIFIED TYPE: Primary | ICD-10-CM

## 2024-06-16 LAB
ALBUMIN SERPL-MCNC: 4.3 G/DL (ref 3.5–5.2)
ALBUMIN/GLOB SERPL: 2 (ref 1.1–2.2)
ALP SERPL-CCNC: 91 U/L (ref 35–104)
ALT SERPL-CCNC: 15 U/L (ref 10–35)
ANION GAP SERPL CALC-SCNC: 10 MMOL/L (ref 5–15)
AST SERPL-CCNC: 16 U/L (ref 10–35)
BASOPHILS # BLD: 0 K/UL (ref 0–1)
BASOPHILS NFR BLD: 1 % (ref 0–1)
BILIRUB SERPL-MCNC: <0.2 MG/DL (ref 0.2–1)
BUN SERPL-MCNC: 14 MG/DL (ref 6–20)
BUN/CREAT SERPL: 13 (ref 12–20)
CALCIUM SERPL-MCNC: 9.5 MG/DL (ref 8.6–10)
CHLORIDE SERPL-SCNC: 106 MMOL/L (ref 98–107)
CO2 SERPL-SCNC: 26 MMOL/L (ref 22–29)
CREAT SERPL-MCNC: 1.07 MG/DL (ref 0.5–0.9)
D DIMER PPP FEU-MCNC: 0.44 UG/ML(FEU)
DIFFERENTIAL METHOD BLD: ABNORMAL
EOSINOPHIL # BLD: 0.1 K/UL (ref 0–0.4)
EOSINOPHIL NFR BLD: 2 %
ERYTHROCYTE [DISTWIDTH] IN BLOOD BY AUTOMATED COUNT: 12.8 % (ref 11.5–14.5)
GLOBULIN SER CALC-MCNC: 2.2 G/DL (ref 2–4)
GLUCOSE SERPL-MCNC: 116 MG/DL (ref 65–100)
HCT VFR BLD AUTO: 40.2 % (ref 35–47)
HGB BLD-MCNC: 13.4 G/DL (ref 11.5–16)
IMM GRANULOCYTES # BLD AUTO: 0 K/UL (ref 0–0.04)
IMM GRANULOCYTES NFR BLD AUTO: 0 % (ref 0–0.5)
LYMPHOCYTES # BLD: 2 K/UL (ref 0.8–3.5)
LYMPHOCYTES NFR BLD: 31 % (ref 12–49)
MAGNESIUM SERPL-MCNC: 1.9 MG/DL (ref 1.6–2.6)
MCH RBC QN AUTO: 29 PG (ref 26–34)
MCHC RBC AUTO-ENTMCNC: 33.3 G/DL (ref 30–36.5)
MCV RBC AUTO: 87 FL (ref 80–99)
MONOCYTES # BLD: 0.3 K/UL (ref 0–1)
MONOCYTES NFR BLD: 5 % (ref 5–13)
NEUTS SEG # BLD: 3.9 K/UL (ref 1.8–8)
NEUTS SEG NFR BLD: 61 % (ref 32–75)
NRBC # BLD: 0 K/UL (ref 0–0.01)
NRBC BLD-RTO: 0 PER 100 WBC
PLATELET # BLD AUTO: 233 K/UL (ref 150–400)
PMV BLD AUTO: 8.8 FL (ref 8.9–12.9)
POTASSIUM SERPL-SCNC: 4.6 MMOL/L (ref 3.5–5.1)
PROT SERPL-MCNC: 6.5 G/DL (ref 6.4–8.3)
RBC # BLD AUTO: 4.62 M/UL (ref 3.8–5.2)
SODIUM SERPL-SCNC: 142 MMOL/L (ref 136–145)
TROPONIN T SERPL HS-MCNC: <6 NG/L (ref 0–14)
WBC # BLD AUTO: 6.3 K/UL (ref 3.6–11)

## 2024-06-16 PROCEDURE — 83735 ASSAY OF MAGNESIUM: CPT

## 2024-06-16 PROCEDURE — 85379 FIBRIN DEGRADATION QUANT: CPT

## 2024-06-16 PROCEDURE — 85025 COMPLETE CBC W/AUTO DIFF WBC: CPT

## 2024-06-16 PROCEDURE — 99285 EMERGENCY DEPT VISIT HI MDM: CPT

## 2024-06-16 PROCEDURE — 80053 COMPREHEN METABOLIC PANEL: CPT

## 2024-06-16 PROCEDURE — 84484 ASSAY OF TROPONIN QUANT: CPT

## 2024-06-16 PROCEDURE — 93005 ELECTROCARDIOGRAM TRACING: CPT | Performed by: STUDENT IN AN ORGANIZED HEALTH CARE EDUCATION/TRAINING PROGRAM

## 2024-06-16 PROCEDURE — 71046 X-RAY EXAM CHEST 2 VIEWS: CPT

## 2024-06-16 PROCEDURE — 36415 COLL VENOUS BLD VENIPUNCTURE: CPT

## 2024-06-16 ASSESSMENT — HEART SCORE: ECG: NORMAL

## 2024-06-16 ASSESSMENT — PAIN DESCRIPTION - ORIENTATION: ORIENTATION: LEFT

## 2024-06-16 ASSESSMENT — PAIN DESCRIPTION - PAIN TYPE: TYPE: ACUTE PAIN

## 2024-06-16 ASSESSMENT — LIFESTYLE VARIABLES
HOW OFTEN DO YOU HAVE A DRINK CONTAINING ALCOHOL: NEVER
HOW MANY STANDARD DRINKS CONTAINING ALCOHOL DO YOU HAVE ON A TYPICAL DAY: PATIENT DOES NOT DRINK

## 2024-06-16 ASSESSMENT — PAIN - FUNCTIONAL ASSESSMENT: PAIN_FUNCTIONAL_ASSESSMENT: 0-10

## 2024-06-16 ASSESSMENT — PAIN DESCRIPTION - ONSET: ONSET: ON-GOING

## 2024-06-16 ASSESSMENT — PAIN SCALES - GENERAL: PAINLEVEL_OUTOF10: 3

## 2024-06-16 ASSESSMENT — PAIN DESCRIPTION - FREQUENCY: FREQUENCY: CONTINUOUS

## 2024-06-16 ASSESSMENT — PAIN DESCRIPTION - DESCRIPTORS: DESCRIPTORS: SHARP

## 2024-06-16 ASSESSMENT — PAIN DESCRIPTION - LOCATION: LOCATION: CHEST

## 2024-06-17 NOTE — ED PROVIDER NOTES
Diabetes Maternal Grandmother     Cancer Maternal Grandfather     Thyroid Disease Paternal Grandmother     Heart Attack Paternal Grandfather     Breast Cancer Cousin           SOCIAL HISTORY       Social History     Socioeconomic History    Marital status: Single     Spouse name: None    Number of children: None    Years of education: None    Highest education level: None   Tobacco Use    Smoking status: Former     Current packs/day: 0.00     Types: Cigarettes     Quit date: 3/1/2019     Years since quittin.2    Smokeless tobacco: Never   Substance and Sexual Activity    Alcohol use: Yes     Alcohol/week: 0.0 standard drinks of alcohol    Drug use: No   Social History Narrative    Full time: sedentary   Manageable stress    3 kids raised by herself  24 yo daughter   22 yo twin men    2 children live at home and one of the twins got      Social Determinants of Health     Financial Resource Strain: Low Risk  (2024)    Overall Financial Resource Strain (CARDIA)     Difficulty of Paying Living Expenses: Not hard at all   Food Insecurity: No Food Insecurity (2024)    Hunger Vital Sign     Worried About Running Out of Food in the Last Year: Never true     Ran Out of Food in the Last Year: Never true   Transportation Needs: Unknown (2024)    PRAPARE - Transportation     Lack of Transportation (Non-Medical): No   Housing Stability: Unknown (2024)    Housing Stability Vital Sign     Unstable Housing in the Last Year: No           PHYSICAL EXAM    (up to 7 for level 4, 8 or more for level 5)     ED Triage Vitals [24]   BP Temp Temp Source Pulse Respirations SpO2 Height Weight - Scale   126/72 97.6 °F (36.4 °C) Oral 68 20 98 % 1.626 m (5' 4\") 81.6 kg (180 lb)       Body mass index is 30.9 kg/m².    Physical Exam  Vitals reviewed.   Constitutional:       General: She is not in acute distress.     Appearance: Normal appearance. She is not ill-appearing.   HENT:      Head:

## 2024-06-17 NOTE — ED TRIAGE NOTES
Pt arrived via pov with friend, states left sided sharp const. Chest pain 3/10 x1week, did see pcp on Thursday, had an EKG, is to go to lab work this week. Pt states shortness of breath with the pain, and does get palpatations

## 2024-06-18 LAB
EKG ATRIAL RATE: 66 BPM
EKG DIAGNOSIS: NORMAL
EKG P AXIS: 53 DEGREES
EKG P-R INTERVAL: 140 MS
EKG Q-T INTERVAL: 384 MS
EKG QRS DURATION: 78 MS
EKG QTC CALCULATION (BAZETT): 402 MS
EKG R AXIS: 45 DEGREES
EKG T AXIS: 52 DEGREES
EKG VENTRICULAR RATE: 66 BPM

## 2024-06-18 PROCEDURE — 93010 ELECTROCARDIOGRAM REPORT: CPT | Performed by: INTERNAL MEDICINE

## 2024-06-20 LAB
ALBUMIN SERPL-MCNC: 4.3 G/DL (ref 3.8–4.9)
ALP SERPL-CCNC: 90 IU/L (ref 44–121)
ALT SERPL-CCNC: 18 IU/L (ref 0–32)
AST SERPL-CCNC: 14 IU/L (ref 0–40)
BASOPHILS # BLD AUTO: 0 X10E3/UL (ref 0–0.2)
BASOPHILS NFR BLD AUTO: 1 %
BILIRUB SERPL-MCNC: 0.2 MG/DL (ref 0–1.2)
BUN SERPL-MCNC: 13 MG/DL (ref 6–24)
BUN/CREAT SERPL: 16 (ref 9–23)
CALCIUM SERPL-MCNC: 9.3 MG/DL (ref 8.7–10.2)
CHLORIDE SERPL-SCNC: 105 MMOL/L (ref 96–106)
CHOLEST SERPL-MCNC: 233 MG/DL (ref 100–199)
CO2 SERPL-SCNC: 22 MMOL/L (ref 20–29)
CREAT SERPL-MCNC: 0.79 MG/DL (ref 0.57–1)
EGFRCR SERPLBLD CKD-EPI 2021: 91 ML/MIN/1.73
EOSINOPHIL # BLD AUTO: 0.2 X10E3/UL (ref 0–0.4)
EOSINOPHIL NFR BLD AUTO: 3 %
ERYTHROCYTE [DISTWIDTH] IN BLOOD BY AUTOMATED COUNT: 13.1 % (ref 11.7–15.4)
GLOBULIN SER CALC-MCNC: 1.7 G/DL (ref 1.5–4.5)
GLUCOSE SERPL-MCNC: 112 MG/DL (ref 70–99)
HBA1C MFR BLD: 6.1 % (ref 4.8–5.6)
HCT VFR BLD AUTO: 40.1 % (ref 34–46.6)
HDLC SERPL-MCNC: 66 MG/DL
HGB BLD-MCNC: 13.3 G/DL (ref 11.1–15.9)
IMM GRANULOCYTES # BLD AUTO: 0 X10E3/UL (ref 0–0.1)
IMM GRANULOCYTES NFR BLD AUTO: 0 %
IMP & REVIEW OF LAB RESULTS: NORMAL
LDLC SERPL CALC-MCNC: 143 MG/DL (ref 0–99)
LYMPHOCYTES # BLD AUTO: 1.5 X10E3/UL (ref 0.7–3.1)
LYMPHOCYTES NFR BLD AUTO: 31 %
MCH RBC QN AUTO: 28.9 PG (ref 26.6–33)
MCHC RBC AUTO-ENTMCNC: 33.2 G/DL (ref 31.5–35.7)
MCV RBC AUTO: 87 FL (ref 79–97)
MONOCYTES # BLD AUTO: 0.3 X10E3/UL (ref 0.1–0.9)
MONOCYTES NFR BLD AUTO: 6 %
NEUTROPHILS # BLD AUTO: 2.8 X10E3/UL (ref 1.4–7)
NEUTROPHILS NFR BLD AUTO: 59 %
PLATELET # BLD AUTO: 214 X10E3/UL (ref 150–450)
POTASSIUM SERPL-SCNC: 5 MMOL/L (ref 3.5–5.2)
PROT SERPL-MCNC: 6 G/DL (ref 6–8.5)
RBC # BLD AUTO: 4.61 X10E6/UL (ref 3.77–5.28)
SODIUM SERPL-SCNC: 141 MMOL/L (ref 134–144)
T4 FREE SERPL-MCNC: 1.18 NG/DL (ref 0.82–1.77)
TRIGL SERPL-MCNC: 137 MG/DL (ref 0–149)
TSH SERPL DL<=0.005 MIU/L-ACNC: 2.21 UIU/ML (ref 0.45–4.5)
VLDLC SERPL CALC-MCNC: 24 MG/DL (ref 5–40)
WBC # BLD AUTO: 4.7 X10E3/UL (ref 3.4–10.8)

## 2024-07-16 ENCOUNTER — OFFICE VISIT (OUTPATIENT)
Age: 54
End: 2024-07-16
Payer: COMMERCIAL

## 2024-07-16 VITALS
SYSTOLIC BLOOD PRESSURE: 119 MMHG | HEART RATE: 70 BPM | HEIGHT: 64 IN | DIASTOLIC BLOOD PRESSURE: 76 MMHG | WEIGHT: 182.2 LBS | RESPIRATION RATE: 14 BRPM | BODY MASS INDEX: 31.1 KG/M2 | OXYGEN SATURATION: 94 %

## 2024-07-16 DIAGNOSIS — R00.2 PALPITATIONS: ICD-10-CM

## 2024-07-16 DIAGNOSIS — Z00.00 WELL ADULT EXAM: Primary | ICD-10-CM

## 2024-07-16 DIAGNOSIS — M62.838 MUSCLE SPASM: ICD-10-CM

## 2024-07-16 DIAGNOSIS — E78.5 DYSLIPIDEMIA: ICD-10-CM

## 2024-07-16 DIAGNOSIS — R10.31 RIGHT LOWER QUADRANT PAIN: ICD-10-CM

## 2024-07-16 DIAGNOSIS — G43.119 MIGRAINE WITH AURA, INTRACTABLE, WITHOUT STATUS MIGRAINOSUS: ICD-10-CM

## 2024-07-16 DIAGNOSIS — F41.9 ANXIETY: ICD-10-CM

## 2024-07-16 PROCEDURE — 99214 OFFICE O/P EST MOD 30 MIN: CPT | Performed by: INTERNAL MEDICINE

## 2024-07-16 PROCEDURE — 99396 PREV VISIT EST AGE 40-64: CPT | Performed by: INTERNAL MEDICINE

## 2024-07-16 RX ORDER — DIAZEPAM 2 MG/1
TABLET ORAL
Qty: 8 TABLET | Refills: 0 | Status: SHIPPED | OUTPATIENT
Start: 2024-07-16 | End: 2024-08-08

## 2024-07-16 RX ORDER — PROPRANOLOL HYDROCHLORIDE 10 MG/1
10 TABLET ORAL 2 TIMES DAILY PRN
Qty: 30 TABLET | Refills: 1 | Status: SHIPPED | OUTPATIENT
Start: 2024-07-16

## 2024-07-16 ASSESSMENT — PATIENT HEALTH QUESTIONNAIRE - PHQ9
SUM OF ALL RESPONSES TO PHQ QUESTIONS 1-9: 0
1. LITTLE INTEREST OR PLEASURE IN DOING THINGS: NOT AT ALL
2. FEELING DOWN, DEPRESSED OR HOPELESS: NOT AT ALL
SUM OF ALL RESPONSES TO PHQ9 QUESTIONS 1 & 2: 0

## 2024-07-16 NOTE — PROGRESS NOTES
ASSESSMENT & PLAN:  1. Well adult exam  Patient appears to be in overall good health.  She does not have any acute issues although has had some breakthrough palpitations and muscle spasms.      She recently had her mammogram in June 2024 within normal limits  She had a colonoscopy in May 2024 with Dr. Derrell Paniagua.  She was requested to follow-up endoscopy in 2026 and follow-up colonoscopy in 2034.  She has not followed up with her dermatologist but is aware that she needs to.    2. Palpitations  Not fully controlled   had cardiology workup with Dr. Jon negative workup    For her as needed palpitations will use propranolol low-dose  Discussed with patient side effects discussed    -     propranolol (INDERAL) 10 MG tablet; Take 1 tablet by mouth 2 times daily as needed (palpitations/), Disp-30 tablet, R-1Normal  3. Muscle spasm  Not controlled  Currently with lower lumbar paraspinal spasm  Discussed with patient and she is planning on seeing her chiropractor  Will try also diazepam at night.  She is clearly aware that this is not a continuous medication and is used for acute flares.  She will not take with any sedative medications    -     diazePAM (VALIUM) 2 MG tablet; Take 1-2 tabs po qhs for severe paraspinal muscle spasm. Caution do not take with other sed medications or products, Disp-8 tablet, R-0Normal  4. BMI 31.0-31.9,adult  Empathetic listening provided and solutions discussed  Voices that she did lose weight in the past prior to her son's wedding through weight training specifically.  Discussed with patient that she can again also change her body composition and get back to her training.  She is thinking about getting her dov/Lyn Seaman type of takes and doing at home.  She prefers not to exercise alone and enjoys exercise with other people.  This does cause some dependence on others for exercise so she will try the types        5. Migraine with aura, intractable, without status

## 2024-08-20 ENCOUNTER — CLINICAL DOCUMENTATION (OUTPATIENT)
Age: 54
End: 2024-08-20

## 2024-09-05 DIAGNOSIS — F32.1 MAJOR DEPRESSIVE DISORDER, SINGLE EPISODE, MODERATE (HCC): ICD-10-CM

## 2024-09-05 RX ORDER — BUPROPION HYDROCHLORIDE 150 MG/1
150 TABLET, EXTENDED RELEASE ORAL 2 TIMES DAILY
Qty: 60 TABLET | Refills: 2 | Status: SHIPPED | OUTPATIENT
Start: 2024-09-05

## 2024-10-14 DIAGNOSIS — G43.119 INTRACTABLE MIGRAINE WITH AURA WITHOUT STATUS MIGRAINOSUS: ICD-10-CM

## 2024-10-14 RX ORDER — RIMEGEPANT SULFATE 75 MG/75MG
1 TABLET, ORALLY DISINTEGRATING ORAL EVERY OTHER DAY
Qty: 24 TABLET | Refills: 3 | Status: SHIPPED | OUTPATIENT
Start: 2024-10-14

## 2024-11-07 DIAGNOSIS — F43.9 SITUATIONAL STRESS: ICD-10-CM

## 2024-11-07 RX ORDER — BUSPIRONE HYDROCHLORIDE 5 MG/1
5 TABLET ORAL 3 TIMES DAILY
Qty: 270 TABLET | Refills: 0 | Status: SHIPPED | OUTPATIENT
Start: 2024-11-07

## 2024-12-10 DIAGNOSIS — F43.9 SITUATIONAL STRESS: ICD-10-CM

## 2024-12-10 DIAGNOSIS — F32.1 MAJOR DEPRESSIVE DISORDER, SINGLE EPISODE, MODERATE (HCC): ICD-10-CM

## 2024-12-13 ENCOUNTER — PATIENT MESSAGE (OUTPATIENT)
Facility: CLINIC | Age: 54
End: 2024-12-13

## 2024-12-13 DIAGNOSIS — G43.119 INTRACTABLE MIGRAINE WITH AURA WITHOUT STATUS MIGRAINOSUS: ICD-10-CM

## 2024-12-13 DIAGNOSIS — F43.9 SITUATIONAL STRESS: ICD-10-CM

## 2024-12-13 DIAGNOSIS — F32.1 MAJOR DEPRESSIVE DISORDER, SINGLE EPISODE, MODERATE (HCC): ICD-10-CM

## 2024-12-13 RX ORDER — OMEPRAZOLE 40 MG/1
CAPSULE, DELAYED RELEASE ORAL
Qty: 30 CAPSULE | Status: CANCELLED | OUTPATIENT
Start: 2024-12-13

## 2024-12-13 RX ORDER — BUTALBITAL, ACETAMINOPHEN AND CAFFEINE 50; 325; 40 MG/1; MG/1; MG/1
1 TABLET ORAL EVERY 6 HOURS PRN
Qty: 180 TABLET | Status: CANCELLED | OUTPATIENT
Start: 2024-12-13

## 2024-12-13 RX ORDER — CLONAZEPAM 0.5 MG/1
0.25 TABLET ORAL 2 TIMES DAILY PRN
Qty: 20 TABLET | Refills: 0 | Status: CANCELLED | OUTPATIENT
Start: 2024-12-13 | End: 2025-01-02

## 2024-12-16 RX ORDER — BUPROPION HYDROCHLORIDE 150 MG/1
150 TABLET, EXTENDED RELEASE ORAL 2 TIMES DAILY
Qty: 60 TABLET | Refills: 2 | Status: SHIPPED | OUTPATIENT
Start: 2024-12-16 | End: 2024-12-17

## 2024-12-16 RX ORDER — BUSPIRONE HYDROCHLORIDE 5 MG/1
5 TABLET ORAL 3 TIMES DAILY
Qty: 270 TABLET | Refills: 0 | Status: SHIPPED | OUTPATIENT
Start: 2024-12-16 | End: 2024-12-17

## 2024-12-17 RX ORDER — BUPROPION HYDROCHLORIDE 150 MG/1
150 TABLET, EXTENDED RELEASE ORAL 2 TIMES DAILY
Qty: 180 TABLET | Refills: 1 | Status: SHIPPED | OUTPATIENT
Start: 2024-12-17

## 2024-12-17 RX ORDER — BUSPIRONE HYDROCHLORIDE 5 MG/1
5 TABLET ORAL 3 TIMES DAILY
Qty: 270 TABLET | Refills: 0 | Status: SHIPPED | OUTPATIENT
Start: 2024-12-17

## 2025-04-21 DIAGNOSIS — G43.119 INTRACTABLE MIGRAINE WITH AURA WITHOUT STATUS MIGRAINOSUS: ICD-10-CM

## 2025-04-21 DIAGNOSIS — F43.9 SITUATIONAL STRESS: ICD-10-CM

## 2025-04-22 RX ORDER — RIMEGEPANT SULFATE 75 MG/75MG
1 TABLET, ORALLY DISINTEGRATING ORAL EVERY OTHER DAY
Qty: 24 TABLET | Refills: 3 | Status: ACTIVE | OUTPATIENT
Start: 2025-04-22

## 2025-04-22 RX ORDER — CLONAZEPAM 0.5 MG/1
0.25 TABLET ORAL 2 TIMES DAILY PRN
Qty: 20 TABLET | Refills: 0 | Status: SHIPPED | OUTPATIENT
Start: 2025-04-22 | End: 2025-05-12

## 2025-06-04 DIAGNOSIS — F32.1 MAJOR DEPRESSIVE DISORDER, SINGLE EPISODE, MODERATE (HCC): ICD-10-CM

## 2025-06-04 RX ORDER — BUPROPION HYDROCHLORIDE 150 MG/1
150 TABLET, EXTENDED RELEASE ORAL 2 TIMES DAILY
Qty: 180 TABLET | Refills: 1 | Status: SHIPPED | OUTPATIENT
Start: 2025-06-04

## 2025-06-04 NOTE — TELEPHONE ENCOUNTER
Received [employer] message requesting a refill of her bupropion 150mg tablets.     Last visit 7/16/24  Follow up 7/30/25    No labs done within the past 6 months.

## 2025-07-11 DIAGNOSIS — F43.9 SITUATIONAL STRESS: ICD-10-CM

## 2025-07-15 RX ORDER — BUSPIRONE HYDROCHLORIDE 5 MG/1
5 TABLET ORAL DAILY
Qty: 90 TABLET | Refills: 0 | Status: SHIPPED | OUTPATIENT
Start: 2025-07-15 | End: 2025-10-13

## 2025-08-29 ENCOUNTER — HOSPITAL ENCOUNTER (OUTPATIENT)
Facility: HOSPITAL | Age: 55
Discharge: HOME | End: 2025-09-01
Attending: INTERNAL MEDICINE
Payer: COMMERCIAL

## 2025-08-29 ENCOUNTER — TRANSCRIBE ORDERS (OUTPATIENT)
Facility: HOSPITAL | Age: 55
End: 2025-08-29

## 2025-08-29 VITALS — BODY MASS INDEX: 30.73 KG/M2 | WEIGHT: 180 LBS | HEIGHT: 64 IN

## 2025-08-29 DIAGNOSIS — Z12.31 OTHER SCREENING MAMMOGRAM: ICD-10-CM

## 2025-08-29 DIAGNOSIS — Z12.31 OTHER SCREENING MAMMOGRAM: Primary | ICD-10-CM

## 2025-08-29 PROCEDURE — 77067 SCR MAMMO BI INCL CAD: CPT

## (undated) DEVICE — STERILE POLYISOPRENE POWDER-FREE SURGICAL GLOVES: Brand: PROTEXIS

## (undated) DEVICE — STRAP,POSITIONING,KNEE/BODY,FOAM,4X60": Brand: MEDLINE

## (undated) DEVICE — TUBING INSUF 0.3UM FLTR W/ LUERLOCK CONN

## (undated) DEVICE — SYR 10ML LUER LOK 1/5ML GRAD --

## (undated) DEVICE — CUTTER ENDOSCP L340MM LIN ARTC SGL STROKE FIRING ENDOPATH

## (undated) DEVICE — COVER LT HNDL PLAS RIG 1 PER PK

## (undated) DEVICE — SUTURE MCRYL SZ 4-0 L27IN ABSRB UD L19MM PS-2 1/2 CIR PRIM Y426H

## (undated) DEVICE — 3M™ TEGADERM™ TRANSPARENT FILM DRESSING FRAME STYLE, 1624W, 2-3/8 IN X 2-3/4 IN (6 CM X 7 CM), 100/CT 4CT/CASE: Brand: 3M™ TEGADERM™

## (undated) DEVICE — SURGICAL PROCEDURE KIT GEN LAPAROSCOPY LF

## (undated) DEVICE — PAD,NON-ADHERENT,3X8,STERILE,LF,1/PK: Brand: MEDLINE

## (undated) DEVICE — LAPAROSCOPIC TROCAR SLEEVE/SINGLE USE: Brand: KII® OPTICAL ACCESS SYSTEM

## (undated) DEVICE — INFECTION CONTROL KIT SYS

## (undated) DEVICE — TROCAR: Brand: KII® OPTICAL ACCESS SYSTEM

## (undated) DEVICE — TROCAR: Brand: KII® SLEEVE

## (undated) DEVICE — TISSUE RETRIEVAL SYSTEM: Brand: INZII RETRIEVAL SYSTEM

## (undated) DEVICE — DEVICE TRNSF SPIK STL 2008S] MICROTEK MEDICAL INC]

## (undated) DEVICE — (D)PREP SKN CHLRAPRP APPL 26ML -- CONVERT TO ITEM 371833

## (undated) DEVICE — NEEDLE HYPO 22GA L1.5IN BLK S STL HUB POLYPR SHLD REG BVL

## (undated) DEVICE — SUTURE SZ 0 27IN 5/8 CIR UR-6  TAPER PT VIOLET ABSRB VICRYL J603H

## (undated) DEVICE — SOL IRRIGATION INJ NACL 0.9% 500ML BTL

## (undated) DEVICE — STRIP,CLOSURE,WOUND,MEDI-STRIP,1/2X4: Brand: MEDLINE

## (undated) DEVICE — REM POLYHESIVE ADULT PATIENT RETURN ELECTRODE: Brand: VALLEYLAB